# Patient Record
Sex: FEMALE | Race: WHITE | NOT HISPANIC OR LATINO | Employment: OTHER | URBAN - NONMETROPOLITAN AREA
[De-identification: names, ages, dates, MRNs, and addresses within clinical notes are randomized per-mention and may not be internally consistent; named-entity substitution may affect disease eponyms.]

---

## 2017-01-24 ENCOUNTER — CLINICAL SUPPORT (OUTPATIENT)
Dept: CARDIOLOGY | Facility: CLINIC | Age: 82
End: 2017-01-24

## 2017-01-24 DIAGNOSIS — Z95.0 CARDIAC PACEMAKER IN SITU: Primary | ICD-10-CM

## 2017-01-24 DIAGNOSIS — I49.5 SA NODE DYSFUNCTION (HCC): ICD-10-CM

## 2017-01-24 PROCEDURE — 93280 PM DEVICE PROGR EVAL DUAL: CPT | Performed by: INTERNAL MEDICINE

## 2017-01-24 NOTE — MR AVS SNAPSHOT
Kal Fountain   1/24/2017 2:00 PM   Appointment    Dept Phone:  [phone]   Encounter #:  [id number]    Provider:  [facility]   Department:  [facility]                Your Full Care Plan              Your Updated Medication List          This list is accurate as of: 1/24/17  1:55 PM.  Always use your most recent med list.                acetaminophen 650 MG 8 hr tablet   Commonly known as:  TYLENOL       aspirin 81 MG EC tablet       diltiaZEM  MG 24 hr capsule   Commonly known as:  CARDIZEM CD       glucosamine sulfate 500 MG capsule capsule       lisinopril 40 MG tablet   Commonly known as:  PRINIVIL,ZESTRIL       metoprolol tartrate 100 MG tablet   Commonly known as:  LOPRESSOR       * MULTIVITAMIN ADULT PO       * ICAPS PO       polyethylene glycol powder   Commonly known as:  MIRALAX       pravastatin 40 MG tablet   Commonly known as:  PRAVACHOL       risperiDONE 0.25 MG tablet   Commonly known as:  risperDAL       * Notice:  This list has 2 medication(s) that are the same as other medications prescribed for you. Read the directions carefully, and ask your doctor or other care provider to review them with you.            Instructions     None    Patient Instructions History      Upcoming Appointments     Visit Type Date Time Department    PACEMAKER CHECK 1/24/2017  2:00 PM [facility]    FOLLOW UP 3/10/2017 10:30 AM [facility] FOLLOW UP 4/5/2017  8:15 AM [facility]    PACEMAKER CHECK 7/26/2017  8:30 AM [facility]      MyChart Signup     [facility] [employer] allows you to send messages to your doctor, view your test results, renew your prescriptions, schedule appointments, and more. To sign up, go to [facility] and click on the Sign Up Now link in the New User? box. Enter your [employer] Activation Code exactly as it appears below along with the last four digits of your  Social Security Number and your Date of Birth () to complete the sign-up process. If you do not sign up before the expiration date, you must request a new code.    [employer] Activation Code: [email or web address]  Expires: 2017  1:54 PM    If you have questions, you can email [email or web address] or call [phone] to talk to our [employer] staff. Remember, [employer] is NOT to be used for urgent needs. For medical emergencies, dial 911.               Other Info from Your Visit           Your Appointments     2017  2:00 PM CST   PACEMAKER CHECK with [facility] (--)    [address]  [facility] [address]   [phone]            Mar 10, 2017 10:30 AM CST   Follow Up with FEMI Middleton   [facility] (--)    [address]  [facility] [id number] [phone]           Arrive 15 minutes prior to appointment.            2017  8:15 AM CDT   Hospital Follow Up with Luis Armando Brown MD   [facility]ART GROUP (--)    [address]  [place] [address]   [phone]            2017  8:30 AM CDT   PACEMAKER CHECK with [facility] (--)    [address]  [place] [address]   [phone]              Allergies     No Known Allergies      Vital Signs     Smoking Status                   Never Smoker

## 2017-01-24 NOTE — PROGRESS NOTES
Dual Chamber Pacemaker Evaluation Report  In office    January 24, 2017    Primary Cardiologist: Kevin  : Medtronic Model: Adapta  Implant date: December 21, 2016    Reason for evaluation:6 week implant follow up  Indication for pacemaker: SA node dysfunction    Measurements  Atrial sensing - P wave: 4-5.6 mV  Atrial threshold: 1V@ 0.4ms  Atrial lead impedance: 565 ohms  Ventricular sensing - R wave: 16-22.4 mV  Ventricular threshold: 0.75 V @ 0.4 ms  Ventricular lead impedance:   631 ohms     Diagnostic Data  Atrial paced: 80.1 %  Ventricular paced: 3.9 %  Other: 2 SVT episodes- 2 seconds long, max v rate 219 bpm   Incision well approximated, free from redness and drainage  Battery status: satisfactory   Est 9 years      Final Parameters  Mode:  AAI+  Lower rate: 60 bpm   Upper rate: 130 bpm  AV Delay: paced- 150 ms  Sensed-120 ms  Atrial - Amplitude: 2 V   Pulse width: 0.4 ms   Sensitivity: 0.5 mV     Ventricular - Amplitude: 1.5 V  Pulse width: 0.4 ms  Sensitivity: 5.6 mV    Changes made: changed atrial output to 2V  Conclusions: normal pacemaker function and stable pacing and sensing thresholds    Follow up: 6 months

## 2017-03-29 PROBLEM — K21.9 GASTRO-ESOPHAGEAL REFLUX: Status: ACTIVE | Noted: 2017-03-29

## 2017-03-29 PROBLEM — I10 HYPERTENSION: Status: ACTIVE | Noted: 2017-03-29

## 2017-03-29 PROBLEM — H61.20 CERUMEN DEBRIS ON TYMPANIC MEMBRANE: Status: ACTIVE | Noted: 2017-03-29

## 2017-03-29 PROBLEM — Z95.0 STATUS POST PLACEMENT OF CARDIAC PACEMAKER: Status: ACTIVE | Noted: 2017-03-29

## 2017-03-29 PROBLEM — I87.2 VENOUS INSUFFICIENCY OF LEG: Status: ACTIVE | Noted: 2017-03-29

## 2017-03-29 PROBLEM — M19.90 ARTHRITIS: Status: ACTIVE | Noted: 2017-03-29

## 2017-03-29 PROBLEM — R60.9 EDEMA: Status: ACTIVE | Noted: 2017-03-29

## 2017-03-29 PROBLEM — I49.5 SINOATRIAL NODE DYSFUNCTION (HCC): Status: ACTIVE | Noted: 2017-03-29

## 2017-03-29 PROBLEM — J30.9 ALLERGIC RHINITIS: Status: ACTIVE | Noted: 2017-03-29

## 2017-03-29 PROBLEM — I47.1 PAROXYSMAL SVT (SUPRAVENTRICULAR TACHYCARDIA) (HCC): Status: ACTIVE | Noted: 2017-03-29

## 2017-04-05 ENCOUNTER — OFFICE VISIT (OUTPATIENT)
Dept: CARDIOLOGY | Facility: CLINIC | Age: 82
End: 2017-04-05

## 2017-04-05 VITALS
DIASTOLIC BLOOD PRESSURE: 70 MMHG | HEIGHT: 66 IN | BODY MASS INDEX: 26.36 KG/M2 | WEIGHT: 164 LBS | SYSTOLIC BLOOD PRESSURE: 118 MMHG | HEART RATE: 60 BPM

## 2017-04-05 DIAGNOSIS — I47.1 PAROXYSMAL SVT (SUPRAVENTRICULAR TACHYCARDIA) (HCC): ICD-10-CM

## 2017-04-05 DIAGNOSIS — Z95.0 CARDIAC PACEMAKER IN SITU: Primary | ICD-10-CM

## 2017-04-05 DIAGNOSIS — I49.5 SINOATRIAL NODE DYSFUNCTION (HCC): ICD-10-CM

## 2017-04-05 PROCEDURE — 99213 OFFICE O/P EST LOW 20 MIN: CPT | Performed by: INTERNAL MEDICINE

## 2017-04-05 RX ORDER — SENNA AND DOCUSATE SODIUM 50; 8.6 MG/1; MG/1
1 TABLET, FILM COATED ORAL DAILY
COMMUNITY

## 2017-04-05 RX ORDER — OMEPRAZOLE 20 MG/1
20 CAPSULE, DELAYED RELEASE ORAL 2 TIMES DAILY
COMMUNITY

## 2017-04-05 NOTE — PROGRESS NOTES
Subjective    Kal Fountain is a 89 y.o. female. Fu of PGR and rhythm    History of Present Illness     S/P PGR 12/16:  This was her 2nd pgr and went well. Interrogation 1/17 is good and healing is good and not at all bothersome.     SSS:  No palpitations and rhythm is good on pacer checks.    HTN;  Good control today and at the NH too. Tolerates meds ok.      The following portions of the patient's history were reviewed and updated as appropriate: allergies, current medications, past family history, past medical history, past social history, past surgical history and problem list.    Patient Active Problem List   Diagnosis   • Cardiac pacemaker in situ   • Hypertension   • Paroxysmal SVT (supraventricular tachycardia)   • Sinoatrial node dysfunction   • Venous insufficiency of leg   • Allergic rhinitis   • Gastro-esophageal reflux   • Cerumen debris on tympanic membrane   • Arthritis   • Status post placement of cardiac pacemaker   • Edema       No Known Allergies    Family History   Problem Relation Age of Onset   • Coronary artery disease Father        Social History     Social History   • Marital status:      Spouse name: N/A   • Number of children: N/A   • Years of education: N/A     Occupational History   • Not on file.     Social History Main Topics   • Smoking status: Never Smoker   • Smokeless tobacco: Not on file   • Alcohol use No   • Drug use: Defer   • Sexual activity: No     Other Topics Concern   • Not on file     Social History Narrative         Current Outpatient Prescriptions:   •  acetaminophen (TYLENOL) 650 MG 8 hr tablet, Take 650 mg by mouth 2 (Two) Times a Day., Disp: , Rfl:   •  aspirin 81 MG EC tablet, Take 81 mg by mouth Daily., Disp: , Rfl:   •  diltiazem CD (CARDIZEM CD) 120 MG 24 hr capsule, Take 120 mg by mouth Daily., Disp: , Rfl:   •  glucosamine sulfate 500 MG capsule capsule, Take 1,000 mg by mouth Daily., Disp: , Rfl:   •  lisinopril (PRINIVIL,ZESTRIL) 40 MG tablet, Take 40  "mg by mouth Daily., Disp: , Rfl:   •  metoprolol tartrate (LOPRESSOR) 100 MG tablet, Take 100 mg by mouth 2 (Two) Times a Day., Disp: , Rfl:   •  Multiple Vitamins-Minerals (ICAPS PO), Take 2 tablets by mouth Daily., Disp: , Rfl:   •  Multiple Vitamins-Minerals (MULTIVITAMIN ADULT PO), Take 1 tablet by mouth Daily., Disp: , Rfl:   •  omeprazole (priLOSEC) 20 MG capsule, Take 20 mg by mouth Daily., Disp: , Rfl:   •  polyethylene glycol (MIRALAX) powder, 17 g Daily., Disp: , Rfl:   •  pravastatin (PRAVACHOL) 40 MG tablet, 40 mg Daily., Disp: , Rfl:   •  sennosides-docusate sodium (SENOKOT-S) 8.6-50 MG tablet, Take 1 tablet by mouth Daily., Disp: , Rfl:     Past Surgical History:   Procedure Laterality Date   • APPENDECTOMY     • BREAST BIOPSY     • CARDIAC ELECTROPHYSIOLOGY PROCEDURE N/A 12/21/2016    Procedure: PPM generator change - dual;  Surgeon: Luis Armando Brown MD;  Location: [facility] CATH INVASIVE LOCATION;  Service:    • CHOLECYSTECTOMY     • MYRINGOPLASTY W/ PAPER PATCH     • PACEMAKER IMPLANTATION         Review of Systems   Constitutional: Positive for fatigue. Negative for activity change and appetite change.   Respiratory: Negative for apnea, chest tightness and shortness of breath.    Cardiovascular: Negative for chest pain, palpitations and leg swelling.        Feet discolourating   Gastrointestinal: Negative for abdominal pain.   Genitourinary: Negative for dysuria.   Musculoskeletal: Negative for myalgias.   Neurological: Negative for weakness and light-headedness.   Psychiatric/Behavioral: Negative for sleep disturbance.       /70 (BP Location: Right arm, Patient Position: Sitting)  Pulse 60  Ht 66\" (167.6 cm)  Wt 164 lb (74.4 kg)  BMI 26.47 kg/m2  Procedures    Objective   Physical Exam   Constitutional: She is oriented to person, place, and time. She appears well-developed and well-nourished.   HENT:   Head: Normocephalic.   Eyes: Pupils are equal, round, and reactive to light. "   Cardiovascular: Normal rate, regular rhythm, normal heart sounds and intact distal pulses.  Exam reveals no gallop and no friction rub.    No murmur heard.  Pulmonary/Chest: Effort normal and breath sounds normal. No respiratory distress. She has no wheezes. She has no rales.   Musculoskeletal: She exhibits no edema or tenderness.   Venous congestion discoloration   Neurological: She is alert and oriented to person, place, and time.   Skin: Skin is warm and dry.   Psychiatric: She has a normal mood and affect.       Assessment/Plan   Kal was seen today for rapid heart rate and hypertension.    Diagnoses and all orders for this visit:    Cardiac pacemaker in situ  Comments:  healed well and functioning ok    Paroxysmal SVT (supraventricular tachycardia)  Comments:  controlled with meds and pacer    Sinoatrial node dysfunction  Comments:  controlled                 Return in about 1 year (around 4/5/2018) for Next scheduled follow up.  No orders of the defined types were placed in this encounter.

## 2017-07-17 ENCOUNTER — HOSPITAL ENCOUNTER (INPATIENT)
Facility: HOSPITAL | Age: 82
LOS: 3 days | Discharge: NURSING, REHAB OR LONG-TERM CARE | End: 2017-07-20
Attending: EMERGENCY MEDICINE | Admitting: FAMILY MEDICINE

## 2017-07-17 ENCOUNTER — APPOINTMENT (OUTPATIENT)
Dept: GENERAL RADIOLOGY | Facility: HOSPITAL | Age: 82
End: 2017-07-17

## 2017-07-17 DIAGNOSIS — I48.91 NEW ONSET A-FIB (HCC): Primary | ICD-10-CM

## 2017-07-17 DIAGNOSIS — I50.21 ACUTE SYSTOLIC CONGESTIVE HEART FAILURE (HCC): ICD-10-CM

## 2017-07-17 DIAGNOSIS — J44.1 CHRONIC OBSTRUCTIVE PULMONARY DISEASE WITH ACUTE EXACERBATION (HCC): ICD-10-CM

## 2017-07-17 DIAGNOSIS — Z74.09 IMPAIRED FUNCTIONAL MOBILITY, BALANCE, AND ENDURANCE: ICD-10-CM

## 2017-07-17 DIAGNOSIS — N39.0 ACUTE UTI (URINARY TRACT INFECTION): ICD-10-CM

## 2017-07-17 LAB
ALBUMIN SERPL-MCNC: 3.9 G/DL (ref 3.5–5)
ALBUMIN/GLOB SERPL: 1.4 G/DL (ref 1.1–2.5)
ALP SERPL-CCNC: 124 U/L (ref 24–120)
ALT SERPL W P-5'-P-CCNC: 93 U/L (ref 0–54)
ANION GAP SERPL CALCULATED.3IONS-SCNC: 10 MMOL/L (ref 4–13)
APTT PPP: 29.1 SECONDS (ref 24.1–34.8)
ARTERIAL PATENCY WRIST A: ABNORMAL
AST SERPL-CCNC: 77 U/L (ref 7–45)
ATMOSPHERIC PRESS: ABNORMAL MMHG
BACTERIA UR QL AUTO: ABNORMAL /HPF
BASE EXCESS BLDA CALC-SCNC: -5.1 MMOL/L (ref -2–2)
BASOPHILS # BLD AUTO: 0.01 10*3/MM3 (ref 0–0.2)
BASOPHILS NFR BLD AUTO: 0.1 % (ref 0–2)
BDY SITE: ABNORMAL
BILIRUB SERPL-MCNC: 1 MG/DL (ref 0.1–1)
BILIRUB UR QL STRIP: NEGATIVE
BUN BLD-MCNC: 18 MG/DL (ref 5–21)
BUN/CREAT SERPL: 24 (ref 7–25)
CALCIUM SPEC-SCNC: 8.9 MG/DL (ref 8.4–10.4)
CHLORIDE SERPL-SCNC: 99 MMOL/L (ref 98–110)
CLARITY UR: ABNORMAL
CO2 SERPL-SCNC: 20 MMOL/L (ref 24–31)
COLOR UR: ABNORMAL
CREAT BLD-MCNC: 0.75 MG/DL (ref 0.5–1.4)
D DIMER PPP FEU-MCNC: 1.48 MG/L (FEU) (ref 0–0.5)
D-LACTATE SERPL-SCNC: 1.4 MMOL/L (ref 0.5–2)
DEPRECATED RDW RBC AUTO: 49.7 FL (ref 40–54)
EOSINOPHIL # BLD AUTO: 0.01 10*3/MM3 (ref 0–0.7)
EOSINOPHIL NFR BLD AUTO: 0.1 % (ref 0–4)
ERYTHROCYTE [DISTWIDTH] IN BLOOD BY AUTOMATED COUNT: 14.8 % (ref 12–15)
GFR SERPL CREATININE-BSD FRML MDRD: 73 ML/MIN/1.73
GLOBULIN UR ELPH-MCNC: 2.8 GM/DL
GLUCOSE BLD-MCNC: 93 MG/DL (ref 70–100)
GLUCOSE UR STRIP-MCNC: NEGATIVE MG/DL
HCO3 BLDA-SCNC: 17.2 MMOL/L (ref 22–26)
HCT VFR BLD AUTO: 40.1 % (ref 37–47)
HGB BLD-MCNC: 13.7 G/DL (ref 12–16)
HGB UR QL STRIP.AUTO: ABNORMAL
INR PPP: 1.11 (ref 0.91–1.09)
KETONES UR QL STRIP: ABNORMAL
LEUKOCYTE ESTERASE UR QL STRIP.AUTO: ABNORMAL
LYMPHOCYTES # BLD AUTO: 1.24 10*3/MM3 (ref 0.72–4.86)
LYMPHOCYTES NFR BLD AUTO: 13.9 % (ref 15–45)
MCH RBC QN AUTO: 32.4 PG (ref 28–32)
MCHC RBC AUTO-ENTMCNC: 34.2 G/DL (ref 33–36)
MCV RBC AUTO: 94.8 FL (ref 82–98)
MODALITY: ABNORMAL
MONOCYTES # BLD AUTO: 0.99 10*3/MM3 (ref 0.19–1.3)
MONOCYTES NFR BLD AUTO: 11.1 % (ref 4–12)
NEUTROPHILS # BLD AUTO: 6.69 10*3/MM3 (ref 1.87–8.4)
NEUTROPHILS NFR BLD AUTO: 74.8 % (ref 39–78)
NITRITE UR QL STRIP: POSITIVE
NT-PROBNP SERPL-MCNC: ABNORMAL PG/ML (ref 0–1800)
PCO2 BLDA: 25.8 MM HG (ref 35–45)
PH BLDA: 7.44 PH UNITS (ref 7.35–7.45)
PH UR STRIP.AUTO: 6 [PH] (ref 5–8)
PLATELET # BLD AUTO: 216 10*3/MM3 (ref 130–400)
PMV BLD AUTO: 10.5 FL (ref 6–12)
PO2 BLDA: 83.7 MM HG (ref 80–100)
POTASSIUM BLD-SCNC: 4.6 MMOL/L (ref 3.5–5.3)
PROT SERPL-MCNC: 6.7 G/DL (ref 6.3–8.7)
PROT UR QL STRIP: ABNORMAL
PROTHROMBIN TIME: 14.7 SECONDS (ref 11.9–14.6)
RBC # BLD AUTO: 4.23 10*6/MM3 (ref 4.2–5.4)
RBC # UR: ABNORMAL /HPF
REF LAB TEST METHOD: ABNORMAL
SAO2 % BLDCOA: 96.8 % (ref 94–100)
SAO2 % BLDCOA: 96.8 % (ref 94–100)
SODIUM BLD-SCNC: 129 MMOL/L (ref 135–145)
SP GR UR STRIP: 1.02 (ref 1–1.03)
SQUAMOUS #/AREA URNS HPF: ABNORMAL /HPF
UROBILINOGEN UR QL STRIP: ABNORMAL
WBC NRBC COR # BLD: 8.94 10*3/MM3 (ref 4.8–10.8)
WBC UR QL AUTO: ABNORMAL /HPF

## 2017-07-17 PROCEDURE — 87086 URINE CULTURE/COLONY COUNT: CPT | Performed by: EMERGENCY MEDICINE

## 2017-07-17 PROCEDURE — 85730 THROMBOPLASTIN TIME PARTIAL: CPT | Performed by: EMERGENCY MEDICINE

## 2017-07-17 PROCEDURE — P9612 CATHETERIZE FOR URINE SPEC: HCPCS

## 2017-07-17 PROCEDURE — 25010000002 CEFTRIAXONE: Performed by: EMERGENCY MEDICINE

## 2017-07-17 PROCEDURE — 36415 COLL VENOUS BLD VENIPUNCTURE: CPT | Performed by: EMERGENCY MEDICINE

## 2017-07-17 PROCEDURE — 80053 COMPREHEN METABOLIC PANEL: CPT | Performed by: EMERGENCY MEDICINE

## 2017-07-17 PROCEDURE — 25010000002 METHYLPREDNISOLONE PER 125 MG: Performed by: EMERGENCY MEDICINE

## 2017-07-17 PROCEDURE — 99285 EMERGENCY DEPT VISIT HI MDM: CPT

## 2017-07-17 PROCEDURE — 71010 HC CHEST PA OR AP: CPT

## 2017-07-17 PROCEDURE — 94799 UNLISTED PULMONARY SVC/PX: CPT

## 2017-07-17 PROCEDURE — 82803 BLOOD GASES ANY COMBINATION: CPT

## 2017-07-17 PROCEDURE — 87186 SC STD MICRODIL/AGAR DIL: CPT | Performed by: EMERGENCY MEDICINE

## 2017-07-17 PROCEDURE — 94640 AIRWAY INHALATION TREATMENT: CPT

## 2017-07-17 PROCEDURE — 87088 URINE BACTERIA CULTURE: CPT | Performed by: EMERGENCY MEDICINE

## 2017-07-17 PROCEDURE — 81001 URINALYSIS AUTO W/SCOPE: CPT | Performed by: EMERGENCY MEDICINE

## 2017-07-17 PROCEDURE — 87040 BLOOD CULTURE FOR BACTERIA: CPT | Performed by: EMERGENCY MEDICINE

## 2017-07-17 PROCEDURE — 93010 ELECTROCARDIOGRAM REPORT: CPT | Performed by: INTERNAL MEDICINE

## 2017-07-17 PROCEDURE — 25010000002 ENOXAPARIN PER 10 MG: Performed by: EMERGENCY MEDICINE

## 2017-07-17 PROCEDURE — 93005 ELECTROCARDIOGRAM TRACING: CPT | Performed by: EMERGENCY MEDICINE

## 2017-07-17 PROCEDURE — 85025 COMPLETE CBC W/AUTO DIFF WBC: CPT | Performed by: EMERGENCY MEDICINE

## 2017-07-17 PROCEDURE — 36600 WITHDRAWAL OF ARTERIAL BLOOD: CPT

## 2017-07-17 PROCEDURE — 83605 ASSAY OF LACTIC ACID: CPT | Performed by: EMERGENCY MEDICINE

## 2017-07-17 PROCEDURE — 85610 PROTHROMBIN TIME: CPT | Performed by: EMERGENCY MEDICINE

## 2017-07-17 PROCEDURE — 85379 FIBRIN DEGRADATION QUANT: CPT | Performed by: EMERGENCY MEDICINE

## 2017-07-17 PROCEDURE — 83880 ASSAY OF NATRIURETIC PEPTIDE: CPT | Performed by: EMERGENCY MEDICINE

## 2017-07-17 RX ORDER — HYDROCODONE BITARTRATE AND ACETAMINOPHEN 5; 325 MG/1; MG/1
1 TABLET ORAL EVERY 4 HOURS PRN
Status: DISCONTINUED | OUTPATIENT
Start: 2017-07-17 | End: 2017-07-20 | Stop reason: HOSPADM

## 2017-07-17 RX ORDER — ONDANSETRON 2 MG/ML
4 INJECTION INTRAMUSCULAR; INTRAVENOUS EVERY 6 HOURS PRN
Status: DISCONTINUED | OUTPATIENT
Start: 2017-07-17 | End: 2017-07-20 | Stop reason: HOSPADM

## 2017-07-17 RX ORDER — LORAZEPAM 0.5 MG/1
0.5 TABLET ORAL DAILY
Status: ON HOLD | COMMUNITY
End: 2017-07-20

## 2017-07-17 RX ORDER — IPRATROPIUM BROMIDE AND ALBUTEROL SULFATE 2.5; .5 MG/3ML; MG/3ML
3 SOLUTION RESPIRATORY (INHALATION) ONCE
Status: COMPLETED | OUTPATIENT
Start: 2017-07-17 | End: 2017-07-17

## 2017-07-17 RX ORDER — ACETAMINOPHEN 325 MG/1
650 TABLET ORAL EVERY 4 HOURS PRN
Status: DISCONTINUED | OUTPATIENT
Start: 2017-07-17 | End: 2017-07-17 | Stop reason: SDUPTHER

## 2017-07-17 RX ORDER — PANTOPRAZOLE SODIUM 40 MG/1
40 TABLET, DELAYED RELEASE ORAL EVERY MORNING
Status: DISCONTINUED | OUTPATIENT
Start: 2017-07-18 | End: 2017-07-20 | Stop reason: HOSPADM

## 2017-07-17 RX ORDER — DILTIAZEM HCL/D5W 125 MG/125
5-15 PLASTIC BAG, INJECTION (ML) INTRAVENOUS
Status: DISCONTINUED | OUTPATIENT
Start: 2017-07-17 | End: 2017-07-20

## 2017-07-17 RX ORDER — ACETAMINOPHEN 500 MG
500 TABLET ORAL 4 TIMES DAILY PRN
Status: DISCONTINUED | OUTPATIENT
Start: 2017-07-17 | End: 2017-07-20 | Stop reason: HOSPADM

## 2017-07-17 RX ORDER — POLYETHYLENE GLYCOL 3350 17 G/17G
17 POWDER, FOR SOLUTION ORAL DAILY
Status: DISCONTINUED | OUTPATIENT
Start: 2017-07-17 | End: 2017-07-20 | Stop reason: HOSPADM

## 2017-07-17 RX ORDER — LUBIPROSTONE 8 UG/1
8 CAPSULE ORAL 2 TIMES DAILY WITH MEALS
COMMUNITY

## 2017-07-17 RX ORDER — ATORVASTATIN CALCIUM 10 MG/1
10 TABLET, FILM COATED ORAL DAILY
Status: DISCONTINUED | OUTPATIENT
Start: 2017-07-17 | End: 2017-07-20 | Stop reason: HOSPADM

## 2017-07-17 RX ORDER — LORAZEPAM 0.5 MG/1
0.5 TABLET ORAL DAILY
Status: DISCONTINUED | OUTPATIENT
Start: 2017-07-17 | End: 2017-07-18

## 2017-07-17 RX ORDER — SODIUM CHLORIDE 0.9 % (FLUSH) 0.9 %
1-10 SYRINGE (ML) INJECTION AS NEEDED
Status: DISCONTINUED | OUTPATIENT
Start: 2017-07-17 | End: 2017-07-20 | Stop reason: HOSPADM

## 2017-07-17 RX ORDER — LUBIPROSTONE 8 UG/1
8 CAPSULE ORAL 2 TIMES DAILY WITH MEALS
Status: DISCONTINUED | OUTPATIENT
Start: 2017-07-17 | End: 2017-07-20 | Stop reason: HOSPADM

## 2017-07-17 RX ORDER — DILTIAZEM HYDROCHLORIDE 240 MG/1
240 CAPSULE, COATED, EXTENDED RELEASE ORAL DAILY
Status: DISCONTINUED | OUTPATIENT
Start: 2017-07-17 | End: 2017-07-20 | Stop reason: HOSPADM

## 2017-07-17 RX ORDER — SACCHAROMYCES BOULARDII 250 MG
250 CAPSULE ORAL 2 TIMES DAILY
COMMUNITY

## 2017-07-17 RX ORDER — ACETAMINOPHEN 500 MG
500 TABLET ORAL 4 TIMES DAILY PRN
COMMUNITY

## 2017-07-17 RX ORDER — ASPIRIN 81 MG/1
81 TABLET ORAL DAILY
Status: DISCONTINUED | OUTPATIENT
Start: 2017-07-17 | End: 2017-07-20 | Stop reason: HOSPADM

## 2017-07-17 RX ORDER — METHYLPREDNISOLONE SODIUM SUCCINATE 125 MG/2ML
125 INJECTION, POWDER, LYOPHILIZED, FOR SOLUTION INTRAMUSCULAR; INTRAVENOUS ONCE
Status: COMPLETED | OUTPATIENT
Start: 2017-07-17 | End: 2017-07-17

## 2017-07-17 RX ORDER — ALUMINA, MAGNESIA, AND SIMETHICONE 2400; 2400; 240 MG/30ML; MG/30ML; MG/30ML
15 SUSPENSION ORAL EVERY 6 HOURS PRN
Status: DISCONTINUED | OUTPATIENT
Start: 2017-07-17 | End: 2017-07-20 | Stop reason: HOSPADM

## 2017-07-17 RX ORDER — METOPROLOL TARTRATE 100 MG/1
100 TABLET ORAL 2 TIMES DAILY
Status: DISCONTINUED | OUTPATIENT
Start: 2017-07-17 | End: 2017-07-20 | Stop reason: HOSPADM

## 2017-07-17 RX ORDER — SENNA AND DOCUSATE SODIUM 50; 8.6 MG/1; MG/1
1 TABLET, FILM COATED ORAL DAILY
Status: DISCONTINUED | OUTPATIENT
Start: 2017-07-17 | End: 2017-07-20 | Stop reason: HOSPADM

## 2017-07-17 RX ORDER — LISINOPRIL 20 MG/1
40 TABLET ORAL DAILY
Status: DISCONTINUED | OUTPATIENT
Start: 2017-07-17 | End: 2017-07-20 | Stop reason: HOSPADM

## 2017-07-17 RX ORDER — ALBUTEROL SULFATE 2.5 MG/3ML
2.5 SOLUTION RESPIRATORY (INHALATION)
Status: COMPLETED | OUTPATIENT
Start: 2017-07-17 | End: 2017-07-17

## 2017-07-17 RX ORDER — SACCHAROMYCES BOULARDII 250 MG
250 CAPSULE ORAL 2 TIMES DAILY
Status: DISCONTINUED | OUTPATIENT
Start: 2017-07-17 | End: 2017-07-20 | Stop reason: HOSPADM

## 2017-07-17 RX ADMIN — ALBUTEROL SULFATE 2.5 MG: 2.5 SOLUTION RESPIRATORY (INHALATION) at 09:55

## 2017-07-17 RX ADMIN — IPRATROPIUM BROMIDE AND ALBUTEROL SULFATE 3 ML: .5; 3 SOLUTION RESPIRATORY (INHALATION) at 10:02

## 2017-07-17 RX ADMIN — METHYLPREDNISOLONE SODIUM SUCCINATE 125 MG: 125 INJECTION, POWDER, FOR SOLUTION INTRAMUSCULAR; INTRAVENOUS at 10:06

## 2017-07-17 RX ADMIN — ENOXAPARIN SODIUM 70 MG: 80 INJECTION SUBCUTANEOUS at 10:08

## 2017-07-17 RX ADMIN — ALBUTEROL SULFATE 2.5 MG: 2.5 SOLUTION RESPIRATORY (INHALATION) at 10:20

## 2017-07-17 RX ADMIN — Medication 5 MG/HR: at 10:14

## 2017-07-17 RX ADMIN — Medication 250 MG: at 18:42

## 2017-07-17 RX ADMIN — CEFTRIAXONE 1 G: 1 INJECTION, POWDER, FOR SOLUTION INTRAMUSCULAR; INTRAVENOUS at 10:14

## 2017-07-17 RX ADMIN — DILTIAZEM HYDROCHLORIDE 240 MG: 240 CAPSULE, EXTENDED RELEASE ORAL at 18:43

## 2017-07-17 RX ADMIN — METOPROLOL TARTRATE 100 MG: 100 TABLET ORAL at 18:43

## 2017-07-17 RX ADMIN — Medication 10 MG/HR: at 17:35

## 2017-07-17 RX ADMIN — LUBIPROSTONE 8 MCG: 8 CAPSULE, GELATIN COATED ORAL at 18:42

## 2017-07-17 RX ADMIN — LORAZEPAM 0.5 MG: 0.5 TABLET ORAL at 18:42

## 2017-07-17 NOTE — PROGRESS NOTES
Discharge Planning Assessment  [facility]     Patient Name: Kal Fountain  MRN: [id number]  Today's Date: 7/17/2017    Admit Date: 7/17/2017          Discharge Needs Assessment       07/17/17 1558    Living Environment    Lives With facility resident    Living Arrangements assisted living   [facility]    Living Environment    Provides Primary Care For no one, unable/limited ability to care for self    Discharge Needs Assessment    Concerns To Be Addressed basic needs concerns    Readmission Within The Last 30 Days no previous admission in last 30 days    Anticipated Changes Related to Illness inability to care for self    Discharge Disposition skilled nursing facility;home healthcare service            Discharge Plan       07/17/17 1600    Case Management/Social Work Plan    Plan PT is a current resident of [facility]. PT has recently dc from [facility]. PT is extremely confused at this time and is unable to effectively participate i dc planning conversation. PT's daughter is out of town currently. SW will attempt to contact her to verify dc plans. PT may need to return to [facility] if her confusion does not resolve. Will follow.     Patient/Family In Agreement With Plan yes        Discharge Placement     No information found                Demographic Summary     None            Functional Status     None            Psychosocial     None            Abuse/Neglect     None            Legal     None            Substance Abuse     None            Patient Forms     None          JARRET Monique

## 2017-07-17 NOTE — ED PROVIDER NOTES
Subjective   Patient is a 89 y.o. female presenting with shortness of breath.   Shortness of Breath   Severity:  Moderate  Onset quality:  Gradual  Timing:  Constant  Progression:  Worsening  Chronicity:  New  Context: not activity, not animal exposure, not emotional upset, not occupational exposure, not pollens, not smoke exposure, not URI and not weather changes    Relieved by:  Nothing  Worsened by:  Nothing  Ineffective treatments:  None tried  Associated symptoms: wheezing    Associated symptoms: no abdominal pain, no chest pain, no claudication, no cough, no diaphoresis, no ear pain, no fever, no headaches, no hemoptysis, no neck pain, no rash, no sore throat, no sputum production, no syncope, no swollen glands and no vomiting    Risk factors: no recent alcohol use, no hx of cancer, no hx of PE/DVT, no obesity, no prolonged immobilization and no tobacco use        Review of Systems   Constitutional: Negative.  Negative for activity change, appetite change, chills, diaphoresis, fatigue and fever.   HENT: Negative for congestion, drooling, ear pain, facial swelling, hearing loss, sinus pressure and sore throat.    Eyes: Negative.  Negative for discharge.   Respiratory: Positive for shortness of breath and wheezing. Negative for cough, hemoptysis and sputum production.    Cardiovascular: Negative for chest pain, claudication and syncope.   Gastrointestinal: Negative for abdominal distention, abdominal pain, blood in stool, diarrhea, nausea and vomiting.   Endocrine: Negative.  Negative for cold intolerance, heat intolerance, polydipsia, polyphagia and polyuria.   Genitourinary: Negative.  Negative for dysuria, flank pain and urgency.   Musculoskeletal: Negative.  Negative for arthralgias, back pain, myalgias, neck pain and neck stiffness.   Skin: Negative.  Negative for color change, pallor and rash.   Allergic/Immunologic: Negative.    Neurological: Negative.  Negative for dizziness, seizures, speech difficulty,  weakness, numbness and headaches.   Hematological: Negative.  Negative for adenopathy.   All other systems reviewed and are negative.      Past Medical History:   Diagnosis Date   • Allergic rhinitis    • Arthritis    • Cerumen debris on tympanic membrane    • Edema    • Gastro-esophageal reflux    • H/O dilation and curettage     Dilation And Curettage Of Uterus - multiple   • Hypertension    • Hypertension, benign    • Pacemaker    • Paroxysmal SVT (supraventricular tachycardia)    • Sinoatrial node dysfunction    • Status post placement of cardiac pacemaker    • Venous insufficiency of leg        Allergies   Allergen Reactions   • Claritin [Loratadine]      Unknown reaction-reported from facility   • Hctz [Hydrochlorothiazide]      Unknown reaction reported from facility       Past Surgical History:   Procedure Laterality Date   • APPENDECTOMY     • BREAST BIOPSY     • CARDIAC ELECTROPHYSIOLOGY PROCEDURE N/A 12/21/2016    Procedure: PPM generator change - dual;  Surgeon: Luis Armando Brown MD;  Location: [facility] INVASIVE LOCATION;  Service:    • CHOLECYSTECTOMY     • MYRINGOPLASTY W/ PAPER PATCH     • PACEMAKER IMPLANTATION         Family History   Problem Relation Age of Onset   • Coronary artery disease Father        Social History     Social History   • Marital status:      Spouse name: N/A   • Number of children: N/A   • Years of education: N/A     Social History Main Topics   • Smoking status: Never Smoker   • Smokeless tobacco: None   • Alcohol use No   • Drug use: Defer   • Sexual activity: No     Other Topics Concern   • None     Social History Narrative           Objective   Physical Exam   Constitutional: She is oriented to person, place, and time. She appears well-developed and well-nourished.   HENT:   Head: Normocephalic.   Right Ear: External ear normal.   Eyes: Conjunctivae are normal. Pupils are equal, round, and reactive to light.   Neck: Normal range of motion. Neck supple.    Cardiovascular: Normal heart sounds and intact distal pulses.  An irregularly irregular rhythm present. Tachycardia present.  PMI is not displaced.  Exam reveals no decreased pulses.    No murmur heard.  Pulmonary/Chest: No accessory muscle usage. Tachypnea noted. She is in respiratory distress. She has decreased breath sounds in the right lower field and the left lower field. She has wheezes in the right middle field, the right lower field, the left middle field and the left lower field. She has rhonchi in the right middle field, the right lower field, the left middle field and the left lower field. She has no rales. She exhibits no tenderness.   Abdominal: Soft. Bowel sounds are normal. There is no tenderness.   Musculoskeletal: Normal range of motion. She exhibits no edema or tenderness.   Lower extremity exam bilaterally is unremarkable.  There is no right or left calf tenderness .  There is no palpable venous cord.  No obvious difference in the size of the legs.  No pitting edema.  The dorsalis pedis and posterior tibial femoral and popliteal pulses are palpable and +2 bilaterally.  Homans sign is negative       Vascular Status -  Her exam exhibits right foot vasculature normal. Her exam exhibits no right foot edema. Her exam exhibits left foot vasculature normal. Her exam exhibits no left foot edema.  Neurological: She is alert and oriented to person, place, and time. She has normal reflexes. No cranial nerve deficit. Coordination normal.   Skin: Skin is warm. No rash noted. No erythema.   Nursing note and vitals reviewed.      Procedures         ED Course  ED Course   Comment By Time   afib Mark Abarca MD 07/17 8763   Patient with a UTI was given antibiotics in A. fib with COPD Mark Abarca MD 07/17 6498   Also lost some CHF will be admitted to the hospital under the hospitalist service Mark Abarca MD 07/17 5620                  [facility]    Final diagnoses:   New onset a-fib   Acute systolic congestive  heart failure   Acute UTI (urinary tract infection)   Chronic obstructive pulmonary disease with acute exacerbation            Mark Abarca MD  07/17/17 4340

## 2017-07-17 NOTE — H&P
[facility] Medicine Services  HISTORY AND PHYSICAL    Date of Admission: 2017  Primary Care Physician: Balaji Gordon MD    Subjective     Chief Complaint: At the time of my examination patient was uncooperative.    The only answer she would give was that she hurt all over.       History of Present Illness  Unable to obtain, patient uncooperative.           Review of Systems   Unable to obtain, patient uncooperative.     Past Medical History:   Past Medical History:   Diagnosis Date   • Allergic rhinitis    • Arthritis    • Cerumen debris on tympanic membrane    • Edema    • Gastro-esophageal reflux    • H/O dilation and curettage     Dilation And Curettage Of Uterus - multiple   • Hypertension    • Hypertension, benign    • Pacemaker    • Paroxysmal SVT (supraventricular tachycardia)    • Sinoatrial node dysfunction    • Status post placement of cardiac pacemaker    • Venous insufficiency of leg        Past Surgical History:  Past Surgical History:   Procedure Laterality Date   • APPENDECTOMY     • BREAST BIOPSY     • CARDIAC ELECTROPHYSIOLOGY PROCEDURE N/A 2016    Procedure: PPM generator change - dual;  Surgeon: Luis Armando Brown MD;  Location: [facility] INVASIVE LOCATION;  Service:    • CHOLECYSTECTOMY     • MYRINGOPLASTY W/ PAPER PATCH     • PACEMAKER IMPLANTATION         Social History: Per records does not smoke or drink.  When I asked these questions she replied yes to every question    Family History: Father  with CAD.  Mother , ? Cause.    Allergies:  Allergies   Allergen Reactions   • Claritin [Loratadine]      Unknown reaction-reported from facility   • Hctz [Hydrochlorothiazide]      Unknown reaction reported from facility       Medications:  Prior to Admission medications    Medication Sig Start Date End Date Taking? Authorizing Provider   acetaminophen (TYLENOL) 500 MG tablet Take 500 mg by mouth 4 (Four) Times a Day As Needed  for Mild Pain (1-3).   Yes Historical Provider, MD   aspirin 81 MG EC tablet Take 81 mg by mouth Daily.   Yes Historical Provider, MD   diltiazem CD (CARDIZEM CD) 120 MG 24 hr capsule Take 120 mg by mouth Daily. 9/10/16  Yes Historical Provider, MD   glucosamine sulfate 500 MG capsule capsule Take 1,000 mg by mouth Daily.   Yes Historical Provider, MD   lisinopril (PRINIVIL,ZESTRIL) 40 MG tablet Take 40 mg by mouth Daily.   Yes Historical Provider, MD   LORazepam (ATIVAN) 0.5 MG tablet Take 0.5 mg by mouth Daily.   Yes Historical Provider, MD   lubiprostone (AMITIZA) 8 MCG capsule Take 8 mcg by mouth 2 (Two) Times a Day With Meals.   Yes Historical Provider, MD   metoprolol tartrate (LOPRESSOR) 100 MG tablet Take 100 mg by mouth 2 (Two) Times a Day. 9/16/16  Yes Historical Provider, MD   Multiple Vitamins-Minerals (EYE VITAMINS) capsule Take 1 capsule by mouth Daily.   Yes Historical Provider, MD   Multiple Vitamins-Minerals (ICAPS PO) Take 2 tablets by mouth Daily.   Yes Historical Provider, MD   Multiple Vitamins-Minerals (MULTIVITAMIN ADULT PO) Take 1 tablet by mouth Daily.   Yes Historical Provider, MD   omeprazole (priLOSEC) 20 MG capsule Take 20 mg by mouth 2 (Two) Times a Day.   Yes Historical Provider, MD   polyethylene glycol (MIRALAX) powder Take 17 g by mouth Daily. 9/7/16  Yes Historical Provider, MD   pravastatin (PRAVACHOL) 40 MG tablet Take 40 mg by mouth Every Night. 9/16/16  Yes Historical Provider, MD   saccharomyces boulardii (FLORASTOR) 250 MG capsule Take 250 mg by mouth 2 (Two) Times a Day.   Yes Historical Provider, MD   sennosides-docusate sodium (SENOKOT-S) 8.6-50 MG tablet Take 1 tablet by mouth Daily.   Yes Historical Provider, MD   acetaminophen (TYLENOL) 650 MG 8 hr tablet Take 650 mg by mouth 2 (Two) Times a Day.  7/17/17  Historical Provider, MD       Objective     Vital Signs: /94 (BP Location: Right arm, Patient Position: Lying)  Pulse (!) 134  Temp 98 °F (36.7 °C) (Axillary)  "  Resp 16  Ht 66\" (167.6 cm)  Wt 162 lb (73.5 kg)  SpO2 96%  BMI 26.15 kg/m2  Physical Exam   Constitutional: She appears well-developed and well-nourished.   HENT:   Head: Normocephalic and atraumatic.   Eyes: Conjunctivae and EOM are normal. Pupils are equal, round, and reactive to light.   Mild lid edema   Neck: Normal range of motion. Neck supple. No JVD present.   Cardiovascular: Normal heart sounds and intact distal pulses.  Exam reveals no gallop and no friction rub.    No murmur heard.  Irregular, rapid   Pulmonary/Chest: Effort normal and breath sounds normal.   Abdominal: Soft. Bowel sounds are normal. There is no hepatosplenomegaly. There is no tenderness.   Musculoskeletal: Normal range of motion. She exhibits edema (mild).   Neurological: No cranial nerve deficit.   Arousable.  Will not answer questions with anything other than yes.    Skin: Skin is warm and dry.   Psychiatric:   Irritable.    Nursing note and vitals reviewed.        Results Reviewed:  Lab Results (last 24 hours)     Procedure Component Value Units Date/Time    Blood Gas, Arterial [[id number]]  (Abnormal) Collected:  07/17/17 0950    Specimen:  Arterial Blood Updated:  07/17/17 0952     Site Arterial: right brachial     Orestes's Test --      Documented in Rapid Comm        pH, Arterial 7.441 pH units      pCO2, Arterial 25.8 (L) mm Hg      pO2, Arterial 83.7 mm Hg      HCO3, Arterial 17.2 (L) mmol/L      Base Excess, Arterial -5.1 (L) mmol/L      O2 Saturation, Arterial 96.8 %      O2 Saturation Calculated 96.8 %      Barometric Pressure for Blood Gas -- mmHg       Component not reported at this site.        Modality Room air    Narrative:       Serial Number: [id number]    : [id number]    CBC & Differential [[id number]] Collected:  07/17/17 0952    Specimen:  Blood Updated:  07/17/17 0958    Narrative:       The following orders were created for panel order CBC & Differential.  Procedure                               Abnormality       "   Status                     ---------                               -----------         ------                     CBC Auto Differential[[id number]]        Abnormal            Final result                 Please view results for these tests on the individual orders.    CBC Auto Differential [[id number]]  (Abnormal) Collected:  07/17/17 0952    Specimen:  Blood Updated:  07/17/17 0958     WBC 8.94 10*3/mm3      RBC 4.23 10*6/mm3      Hemoglobin 13.7 g/dL      Hematocrit 40.1 %      MCV 94.8 fL      MCH 32.4 (H) pg      MCHC 34.2 g/dL      RDW 14.8 %      RDW-SD 49.7 fl      MPV 10.5 fL      Platelets 216 10*3/mm3      Neutrophil % 74.8 %      Lymphocyte % 13.9 (L) %      Monocyte % 11.1 %      Eosinophil % 0.1 %      Basophil % 0.1 %      Neutrophils, Absolute 6.69 10*3/mm3      Lymphocytes, Absolute 1.24 10*3/mm3      Monocytes, Absolute 0.99 10*3/mm3      Eosinophils, Absolute 0.01 10*3/mm3      Basophils, Absolute 0.01 10*3/mm3     Blood Culture [[id number]] Collected:  07/17/17 0944    Specimen:  Blood from Arm, Right Updated:  07/17/17 1002    Blood Culture [[id number]] Collected:  07/17/17 0915    Specimen:  Blood from Arm, Right Updated:  07/17/17 1003    Lactic Acid, Plasma [[id number]]  (Normal) Collected:  07/17/17 0952    Specimen:  Blood Updated:  07/17/17 1005     Lactate 1.4 mmol/L     Comprehensive Metabolic Panel [[id number]]  (Abnormal) Collected:  07/17/17 0952    Specimen:  Blood Updated:  07/17/17 1006     Glucose 93 mg/dL      BUN 18 mg/dL      Creatinine 0.75 mg/dL      Sodium 129 (L) mmol/L      Potassium 4.6 mmol/L      Chloride 99 mmol/L      CO2 20.0 (L) mmol/L      Calcium 8.9 mg/dL      Total Protein 6.7 g/dL      Albumin 3.90 g/dL      ALT (SGPT) 93 (H) U/L      AST (SGOT) 77 (H) U/L      Alkaline Phosphatase 124 (H) U/L      Total Bilirubin 1.0 mg/dL      eGFR Non African Amer 73 mL/min/1.73      Globulin 2.8 gm/dL      A/G Ratio 1.4 g/dL      BUN/Creatinine Ratio 24.0     Anion Gap 10.0  mmol/L     Narrative:       The MDRD GFR formula is only valid for adults with stable renal function between ages 18 and 70.    Protime-INR [[id number]]  (Abnormal) Collected:  07/17/17 0952    Specimen:  Blood Updated:  07/17/17 1011     Protime 14.7 (H) Seconds      INR 1.11 (H)    aPTT [[id number]]  (Normal) Collected:  07/17/17 0952    Specimen:  Blood Updated:  07/17/17 1011     PTT 29.1 seconds     D-dimer, Quantitative [[id number]]  (Abnormal) Collected:  07/17/17 0952    Specimen:  Blood Updated:  07/17/17 1011     D-Dimer, Quantitative 1.48 (H) mg/L (FEU)     Narrative:       Reference Range is 0-0.50 mg/L FEU. However, results <0.50 mg/L FEU tends to rule out DVT or PE. Results >0.50 mg/L FEU are not useful in predicting absence or presence of DVT or PE.    BNP [[id number]]  (Abnormal) Collected:  07/17/17 0952    Specimen:  Blood Updated:  07/17/17 1014     proBNP 20845.0 (H) pg/mL     Urine Culture [[id number]] Collected:  07/17/17 1006    Specimen:  Urine from Urine, Catheter Updated:  07/17/17 1025    Urinalysis With / Culture If Indicated [[id number]]  (Abnormal) Collected:  07/17/17 1006    Specimen:  Urine from Urine, Catheter Updated:  07/17/17 1040     Color, UA Dark Yellow (A)     Appearance, UA Turbid (A)     pH, UA 6.0     Specific Gravity, UA 1.022     Glucose, UA Negative     Ketones, UA 15 mg/dL (1+) (A)     Bilirubin, UA Negative     Blood, UA Large (3+) (A)     Protein, UA >=300 mg/dL (3+) (A)     Leuk Esterase, UA Large (3+) (A)     Nitrite, UA Positive (A)     Urobilinogen, UA 1.0 E.U./dL    Urinalysis, Microscopic Only [[id number]]  (Abnormal) Collected:  07/17/17 1006    Specimen:  Urine from Urine, Catheter Updated:  07/17/17 1040     RBC, UA Too Numerous to Count (A) /HPF      WBC, UA Too Numerous to Count (A) /HPF      Bacteria, UA 3+ (A) /HPF      Squamous Epithelial Cells, UA 3-6 (A) /HPF      Methodology Automated Microscopy        Imaging Results (last 24 hours)     Procedure Component  Value Units Date/Time    XR Chest 1 View [[id number]] Collected:  07/17/17 1156     Updated:  07/17/17 1202    Narrative:       EXAMINATION: XR CHEST 1 VW-. 7/17/2017 12:56 PM EDT     CHEST, ONE VIEW:     HISTORY: Shortness of air     COMPARISON: 07/08/2015     A single frontal chest radiograph was obtained.     FINDINGS:     There is cardiomegaly with permanent cardiac pacemaker observed.     There is bilateral perihilar and lower lobe infiltrates, differential  concerns include pulmonary edema and mild changes of heart failure  correlate with patient presentation.                                     Impression:       1. Cardiomegaly with bilateral perihilar and lobe infiltrates. Pulmonary  edema and congestive heart failure considered.     This report was finalized on 07/17/2017 11:59 by Dr. Rogelio Bedolla MD.          I have personally reviewed and interpreted the radiology studies and ECG obtained at time of admission.     Assessment / Plan     Assessment & Plan  Hospital Problem List     New onset a-fib        Assessment         Paroxysmal atrial fibrillation  Confusion v. Purposefully not interacting with physician  Congestive heart failure. Acute      Plan    Admit  IV cardizem continued  Wean as able   Increase oral cardizem  Will reassess after she rests some.   serial troponin x 3        Code Status: DNR  Health care surrogate not known  Patient does not say  Lives in an assisted living       I discussed the patients findings and my recommendations with patient    Estimated length of stay 3 days    Chanell Bella DO   07/17/17   12:29 PM

## 2017-07-18 ENCOUNTER — APPOINTMENT (OUTPATIENT)
Dept: GENERAL RADIOLOGY | Facility: HOSPITAL | Age: 82
End: 2017-07-18

## 2017-07-18 LAB
ALBUMIN SERPL-MCNC: 3.5 G/DL (ref 3.5–5)
ALBUMIN/GLOB SERPL: 1.3 G/DL (ref 1.1–2.5)
ALP SERPL-CCNC: 98 U/L (ref 24–120)
ALT SERPL W P-5'-P-CCNC: 84 U/L (ref 0–54)
ANION GAP SERPL CALCULATED.3IONS-SCNC: 14 MMOL/L (ref 4–13)
AST SERPL-CCNC: 61 U/L (ref 7–45)
BASOPHILS # BLD AUTO: 0 10*3/MM3 (ref 0–0.2)
BASOPHILS NFR BLD AUTO: 0 % (ref 0–2)
BILIRUB SERPL-MCNC: 0.7 MG/DL (ref 0.1–1)
BUN BLD-MCNC: 19 MG/DL (ref 5–21)
BUN/CREAT SERPL: 28.4 (ref 7–25)
CALCIUM SPEC-SCNC: 8.7 MG/DL (ref 8.4–10.4)
CHLORIDE SERPL-SCNC: 100 MMOL/L (ref 98–110)
CO2 SERPL-SCNC: 16 MMOL/L (ref 24–31)
CREAT BLD-MCNC: 0.67 MG/DL (ref 0.5–1.4)
DEPRECATED RDW RBC AUTO: 50.7 FL (ref 40–54)
EOSINOPHIL # BLD AUTO: 0.01 10*3/MM3 (ref 0–0.7)
EOSINOPHIL NFR BLD AUTO: 0.2 % (ref 0–4)
ERYTHROCYTE [DISTWIDTH] IN BLOOD BY AUTOMATED COUNT: 14.7 % (ref 12–15)
GFR SERPL CREATININE-BSD FRML MDRD: 83 ML/MIN/1.73
GLOBULIN UR ELPH-MCNC: 2.7 GM/DL
GLUCOSE BLD-MCNC: 125 MG/DL (ref 70–100)
HCT VFR BLD AUTO: 36.7 % (ref 37–47)
HGB BLD-MCNC: 12.3 G/DL (ref 12–16)
IMM GRANULOCYTES # BLD: 0.04 10*3/MM3 (ref 0–0.03)
IMM GRANULOCYTES NFR BLD: 0.7 % (ref 0–5)
LYMPHOCYTES # BLD AUTO: 0.47 10*3/MM3 (ref 0.72–4.86)
LYMPHOCYTES NFR BLD AUTO: 8.7 % (ref 15–45)
MCH RBC QN AUTO: 31.9 PG (ref 28–32)
MCHC RBC AUTO-ENTMCNC: 33.5 G/DL (ref 33–36)
MCV RBC AUTO: 95.1 FL (ref 82–98)
MONOCYTES # BLD AUTO: 0.26 10*3/MM3 (ref 0.19–1.3)
MONOCYTES NFR BLD AUTO: 4.8 % (ref 4–12)
NEUTROPHILS # BLD AUTO: 4.63 10*3/MM3 (ref 1.87–8.4)
NEUTROPHILS NFR BLD AUTO: 85.6 % (ref 39–78)
PLATELET # BLD AUTO: 244 10*3/MM3 (ref 130–400)
PMV BLD AUTO: 10.2 FL (ref 6–12)
POTASSIUM BLD-SCNC: 4.6 MMOL/L (ref 3.5–5.3)
PROT SERPL-MCNC: 6.2 G/DL (ref 6.3–8.7)
RBC # BLD AUTO: 3.86 10*6/MM3 (ref 4.2–5.4)
SODIUM BLD-SCNC: 130 MMOL/L (ref 135–145)
WBC NRBC COR # BLD: 5.41 10*3/MM3 (ref 4.8–10.8)

## 2017-07-18 PROCEDURE — 71020 HC CHEST PA AND LATERAL: CPT

## 2017-07-18 PROCEDURE — 25010000002 CEFTRIAXONE: Performed by: EMERGENCY MEDICINE

## 2017-07-18 PROCEDURE — 80053 COMPREHEN METABOLIC PANEL: CPT | Performed by: FAMILY MEDICINE

## 2017-07-18 PROCEDURE — 85025 COMPLETE CBC W/AUTO DIFF WBC: CPT | Performed by: FAMILY MEDICINE

## 2017-07-18 PROCEDURE — 25010000002 FUROSEMIDE PER 20 MG: Performed by: FAMILY MEDICINE

## 2017-07-18 RX ORDER — FUROSEMIDE 10 MG/ML
40 INJECTION INTRAMUSCULAR; INTRAVENOUS ONCE
Status: COMPLETED | OUTPATIENT
Start: 2017-07-18 | End: 2017-07-18

## 2017-07-18 RX ORDER — LORAZEPAM 0.5 MG/1
0.5 TABLET ORAL EVERY 12 HOURS SCHEDULED
Status: DISCONTINUED | OUTPATIENT
Start: 2017-07-18 | End: 2017-07-20 | Stop reason: HOSPADM

## 2017-07-18 RX ADMIN — DILTIAZEM HYDROCHLORIDE 240 MG: 240 CAPSULE, EXTENDED RELEASE ORAL at 09:23

## 2017-07-18 RX ADMIN — ASPIRIN 81 MG: 81 TABLET ORAL at 09:24

## 2017-07-18 RX ADMIN — LORAZEPAM 0.5 MG: 0.5 TABLET ORAL at 20:46

## 2017-07-18 RX ADMIN — FUROSEMIDE 40 MG: 10 INJECTION, SOLUTION INTRAMUSCULAR; INTRAVENOUS at 12:43

## 2017-07-18 RX ADMIN — Medication 250 MG: at 17:53

## 2017-07-18 RX ADMIN — LUBIPROSTONE 8 MCG: 8 CAPSULE, GELATIN COATED ORAL at 09:23

## 2017-07-18 RX ADMIN — POLYETHYLENE GLYCOL 3350 17 G: 17 POWDER, FOR SOLUTION ORAL at 12:43

## 2017-07-18 RX ADMIN — ATORVASTATIN CALCIUM 10 MG: 10 TABLET, FILM COATED ORAL at 09:22

## 2017-07-18 RX ADMIN — METOPROLOL TARTRATE 100 MG: 100 TABLET ORAL at 09:23

## 2017-07-18 RX ADMIN — DOCUSATE SODIUM -SENNOSIDES 1 TABLET: 50; 8.6 TABLET, COATED ORAL at 09:23

## 2017-07-18 RX ADMIN — LUBIPROSTONE 8 MCG: 8 CAPSULE, GELATIN COATED ORAL at 17:53

## 2017-07-18 RX ADMIN — CEFTRIAXONE 1 G: 1 INJECTION, POWDER, FOR SOLUTION INTRAMUSCULAR; INTRAVENOUS at 09:26

## 2017-07-18 RX ADMIN — LISINOPRIL 40 MG: 20 TABLET ORAL at 09:22

## 2017-07-18 RX ADMIN — METOPROLOL TARTRATE 100 MG: 100 TABLET ORAL at 17:53

## 2017-07-18 RX ADMIN — Medication 250 MG: at 09:22

## 2017-07-18 RX ADMIN — LORAZEPAM 0.5 MG: 0.5 TABLET ORAL at 09:23

## 2017-07-18 RX ADMIN — PANTOPRAZOLE SODIUM 40 MG: 40 TABLET, DELAYED RELEASE ORAL at 09:28

## 2017-07-18 NOTE — PLAN OF CARE
Problem: Patient Care Overview (Adult)  Goal: Plan of Care Review  Outcome: Ongoing (interventions implemented as appropriate)    07/18/17 0952   Coping/Psychosocial Response Interventions   Plan Of Care Reviewed With patient   Patient Care Overview   Progress no change   Outcome Evaluation   Outcome Summary/Follow up Plan Initial nutrition assessment. Pt is confused and unable to answer RD questions regarding usual po intake. No data recorded at this time to assess for intake. Upon record review Pt has lost 14# since 12/21 hospital stay. Current BMI is 22.84. Will start Ensure Enlive to promote extra kcal and protein intake to prevent further loss. Will follow for progress and to determine intake status.

## 2017-07-18 NOTE — PLAN OF CARE
Problem: Patient Care Overview (Adult)  Goal: Plan of Care Review  Outcome: Ongoing (interventions implemented as appropriate)    07/18/17 0952 07/18/17 1344   Coping/Psychosocial Response Interventions   Plan Of Care Reviewed With patient --    Patient Care Overview   Progress no change --    Outcome Evaluation   Outcome Summary/Follow up Plan --  Pt continues to be disoriented to time and place. Spoke with daughter, Manuel, on the phone today she explains to me that Ms. Andrews is baseline confused and needs assistance with feedings at home, she also is usually wheelchair bound and needs assistance to go to the restroom. PT has been consulted to eval and treat prior to me getting the pt up to ambulate etc. The pt is very weak and I do not feel comfortable ambulating her without PT evaluation. Pt AF on tele controlled in the 80-90's. Other VSS. Will cont to monitor pt status.        Goal: Adult Individualization and Mutuality  Outcome: Ongoing (interventions implemented as appropriate)    07/18/17 1344   Individualization   Patient Specific Preferences Needs assistance with feeding.    Patient Specific Goals HR WNL.   Patient Specific Interventions HOB elevated.    Mutuality/Individual Preferences   What Anxieties, Fears or Concerns Do You Have About Your Health or Care? None voiced.   What Questions Do You Have About Your Health or Care? None voiced.   What Information Would Help Us Give You More Personalized Care? None voiced.        Goal: Discharge Needs Assessment  Outcome: Ongoing (interventions implemented as appropriate)    Problem: Arrhythmia/Dysrhythmia (Symptomatic) (Adult)  Goal: Signs and Symptoms of Listed Potential Problems Will be Absent or Manageable (Arrhythmia/Dysrhythmia)  Outcome: Ongoing (interventions implemented as appropriate)    07/18/17 0359   Arrhythmia/Dysrhythmia (Symptomatic)   Problems Assessed (Arrhythmia/Dysrhythmia) all   Problems Present (Arrhythmia/Dysrhythmia) electrophysiological  conduction defect         Problem: Fall Risk (Adult)  Goal: Identify Related Risk Factors and Signs and Symptoms  Outcome: Ongoing (interventions implemented as appropriate)    07/18/17 7529   Fall Risk   Fall Risk: Related Risk Factors age-related changes;confusion/agitation;gait/mobility problems;history of falls;environment unfamiliar   Fall Risk: Signs and Symptoms presence of risk factors       Goal: Absence of Falls  Outcome: Ongoing (interventions implemented as appropriate)    07/18/17 1344   Fall Risk (Adult)   Absence of Falls achieves outcome

## 2017-07-18 NOTE — PROGRESS NOTES
[facility] Medicine Services  INPATIENT PROGRESS NOTE    Length of Stay: 1  Date of Admission: 7/17/2017  Primary Care Physician: Balaji Gordon MD    Subjective   Chief Complaint: Feels better.   No pain   HPI   No shortness of breath.  No nausea.   Has been for xray this morning.     No labs done, lab called, will draw immediately.        Review of Systems     All pertinent negatives and positives are as above. All other systems have been reviewed and are negative unless otherwise stated.     Objective    Temp:  [97.3 °F (36.3 °C)-98.5 °F (36.9 °C)] 98.1 °F (36.7 °C)  Heart Rate:  [] 78  Resp:  [16-20] 18  BP: (107-130)/(60-99) 120/62  Physical Exam   Constitutional: She appears well-developed and well-nourished.   Eyes: Conjunctivae and EOM are normal. Pupils are equal, round, and reactive to light.   Neck: Neck supple.   Cardiovascular: Normal rate.  Exam reveals no gallop and no friction rub.    No murmur heard.  Irregular    Per monitor afib rate controlled.   Pulmonary/Chest: Effort normal and breath sounds normal.   Abdominal: Soft. Bowel sounds are normal. There is no hepatosplenomegaly. There is no tenderness.   Musculoskeletal: Normal range of motion. She exhibits no edema.   Neurological: She is alert. No cranial nerve deficit.   Skin: Skin is warm and dry.   Psychiatric: She has a normal mood and affect. Her behavior is normal.   Nursing note and vitals reviewed.          Results Review:  I have reviewed the labs, radiology results, and diagnostic studies.    Laboratory Data:     Results from last 7 days  Lab Units 07/17/17  0952   WBC 10*3/mm3 8.94   HEMOGLOBIN g/dL 13.7   HEMATOCRIT % 40.1   PLATELETS 10*3/mm3 216          Results from last 7 days  Lab Units 07/17/17  0952   SODIUM mmol/L 129*   POTASSIUM mmol/L 4.6   CHLORIDE mmol/L 99   CO2 mmol/L 20.0*   BUN mg/dL 18   CREATININE mg/dL 0.75   CALCIUM mg/dL 8.9   BILIRUBIN mg/dL 1.0   ALK PHOS U/L 124*      ALT (SGPT) U/L 93*   AST (SGOT) U/L 77*   GLUCOSE mg/dL 93       Culture Data:   Blood Culture   Date Value Ref Range Status   07/17/2017 No growth at less than 24 hours  Preliminary   07/17/2017 No growth at less than 24 hours  Preliminary     Urine Culture   Date Value Ref Range Status   07/17/2017 >100,000 CFU/mL Escherichia coli (A)  Preliminary       Radiology Data:   Imaging Results (last 24 hours)     Procedure Component Value Units Date/Time    XR Chest 1 View [[id number]] Collected:  07/17/17 1156     Updated:  07/17/17 1202    Narrative:       EXAMINATION: XR CHEST 1 VW-. 7/17/2017 12:56 PM EDT     CHEST, ONE VIEW:     HISTORY: Shortness of air     COMPARISON: 07/08/2015     A single frontal chest radiograph was obtained.     FINDINGS:     There is cardiomegaly with permanent cardiac pacemaker observed.     There is bilateral perihilar and lower lobe infiltrates, differential  concerns include pulmonary edema and mild changes of heart failure  correlate with patient presentation.                                     Impression:       1. Cardiomegaly with bilateral perihilar and lobe infiltrates. Pulmonary  edema and congestive heart failure considered.     This report was finalized on 07/17/2017 11:59 by Dr. Rogelio Bedolla MD.    XR Chest PA & Lateral [[id number]] Collected:  07/18/17 0820     Updated:  07/18/17 0826    Narrative:       EXAMINATION: XR CHEST PA AND LATERAL-  7/18/2017 9:20 AM EDT     TWO-VIEW CHEST:      HISTORY: Congestive heart failure      Frontal and lateral projection chest radiograph obtained.     COMPARISON:  07/17/2017 07/08/2014      FINDINGS:     There is cardiomegaly. Permanent cardiac pacemaker noted.     There is perihilar bronchovascular interstitial prominence with patchy  airspace opacities in the lower lobes with bilateral pleural effusions.  Underlying COPD change likely. Differential considerations include  pulmonary venous hypertension and congestive heart failure.      Radiographically, chest appears essentially unchanged.                                                                                       Impression:       1. Cardiomegaly with perihilar/lower lobe infiltrates and pleural  effusions. Differential considerations include congestive heart failure.        This report was finalized on 07/18/2017 08:23 by Dr. Rogelio Bedolla MD.          I have reviewed the patient current medications.     Assessment/Plan     Hospital Problem List     New onset a-fib        Assessment    Paroxysmal atrial fibrillation  Confusion v. Purposefully not interacting with physician resolved  Congestive heart failure. Acute      Plan    IV lasix x 1  Increase activity  Home possible in AM        Chanell Bella DO   07/18/17   10:21 AM

## 2017-07-18 NOTE — PLAN OF CARE
Problem: Patient Care Overview (Adult)  Goal: Plan of Care Review  Outcome: Ongoing (interventions implemented as appropriate)    07/18/17 0359   Coping/Psychosocial Response Interventions   Plan Of Care Reviewed With patient   Patient Care Overview   Progress no change   Outcome Evaluation   Outcome Summary/Follow up Plan Patient disoriented. Bed alarm on for safety. Cardizem gtt off, PO started. Remains in AF but rate controlled. Continue to monitor.        Goal: Adult Individualization and Mutuality  Outcome: Ongoing (interventions implemented as appropriate)  Goal: Discharge Needs Assessment  Outcome: Ongoing (interventions implemented as appropriate)    Problem: Arrhythmia/Dysrhythmia (Symptomatic) (Adult)  Goal: Signs and Symptoms of Listed Potential Problems Will be Absent or Manageable (Arrhythmia/Dysrhythmia)  Outcome: Ongoing (interventions implemented as appropriate)    Problem: Fall Risk (Adult)  Goal: Identify Related Risk Factors and Signs and Symptoms  Outcome: Ongoing (interventions implemented as appropriate)  Goal: Absence of Falls  Outcome: Ongoing (interventions implemented as appropriate)

## 2017-07-19 LAB — BACTERIA SPEC AEROBE CULT: ABNORMAL

## 2017-07-19 PROCEDURE — 97162 PT EVAL MOD COMPLEX 30 MIN: CPT

## 2017-07-19 PROCEDURE — 25010000002 CEFTRIAXONE: Performed by: EMERGENCY MEDICINE

## 2017-07-19 PROCEDURE — 94799 UNLISTED PULMONARY SVC/PX: CPT

## 2017-07-19 PROCEDURE — 94760 N-INVAS EAR/PLS OXIMETRY 1: CPT

## 2017-07-19 PROCEDURE — G8981 BODY POS CURRENT STATUS: HCPCS

## 2017-07-19 PROCEDURE — G8982 BODY POS GOAL STATUS: HCPCS

## 2017-07-19 RX ADMIN — LUBIPROSTONE 8 MCG: 8 CAPSULE, GELATIN COATED ORAL at 18:00

## 2017-07-19 RX ADMIN — DOCUSATE SODIUM -SENNOSIDES 1 TABLET: 50; 8.6 TABLET, COATED ORAL at 11:07

## 2017-07-19 RX ADMIN — Medication 250 MG: at 17:59

## 2017-07-19 RX ADMIN — METOPROLOL TARTRATE 100 MG: 100 TABLET ORAL at 18:00

## 2017-07-19 RX ADMIN — Medication 250 MG: at 11:07

## 2017-07-19 RX ADMIN — HYDROCODONE BITARTRATE AND ACETAMINOPHEN 1 TABLET: 5; 325 TABLET ORAL at 00:50

## 2017-07-19 RX ADMIN — LISINOPRIL 40 MG: 20 TABLET ORAL at 11:05

## 2017-07-19 RX ADMIN — HYDROCODONE BITARTRATE AND ACETAMINOPHEN 1 TABLET: 5; 325 TABLET ORAL at 17:59

## 2017-07-19 RX ADMIN — CEFTRIAXONE 1 G: 1 INJECTION, POWDER, FOR SOLUTION INTRAMUSCULAR; INTRAVENOUS at 11:06

## 2017-07-19 RX ADMIN — ATORVASTATIN CALCIUM 10 MG: 10 TABLET, FILM COATED ORAL at 11:05

## 2017-07-19 RX ADMIN — PANTOPRAZOLE SODIUM 40 MG: 40 TABLET, DELAYED RELEASE ORAL at 11:06

## 2017-07-19 RX ADMIN — LORAZEPAM 0.5 MG: 0.5 TABLET ORAL at 11:06

## 2017-07-19 RX ADMIN — ASPIRIN 81 MG: 81 TABLET ORAL at 11:06

## 2017-07-19 RX ADMIN — LUBIPROSTONE 8 MCG: 8 CAPSULE, GELATIN COATED ORAL at 11:05

## 2017-07-19 RX ADMIN — METOPROLOL TARTRATE 100 MG: 100 TABLET ORAL at 11:05

## 2017-07-19 RX ADMIN — DILTIAZEM HYDROCHLORIDE 240 MG: 240 CAPSULE, EXTENDED RELEASE ORAL at 11:05

## 2017-07-19 RX ADMIN — LORAZEPAM 0.5 MG: 0.5 TABLET ORAL at 21:02

## 2017-07-19 NOTE — PLAN OF CARE
Problem: Patient Care Overview (Adult)  Goal: Plan of Care Review  Outcome: Ongoing (interventions implemented as appropriate)    07/19/17 1722   Coping/Psychosocial Response Interventions   Plan Of Care Reviewed With patient   Patient Care Overview   Progress no change   Outcome Evaluation   Outcome Summary/Follow up Plan PT worked with patient today. Pt slept quit a bit today, no complaints of pain. Pt gets confused and kept insisting that she didn't want to have any debts, she wanted to make sure things were paid. Pt is very [place] and cannot see very well. Will continue to monitor.       Goal: Adult Individualization and Mutuality  Outcome: Ongoing (interventions implemented as appropriate)  Goal: Discharge Needs Assessment  Outcome: Ongoing (interventions implemented as appropriate)    Problem: Arrhythmia/Dysrhythmia (Symptomatic) (Adult)  Goal: Signs and Symptoms of Listed Potential Problems Will be Absent or Manageable (Arrhythmia/Dysrhythmia)  Outcome: Ongoing (interventions implemented as appropriate)    Problem: Fall Risk (Adult)  Goal: Identify Related Risk Factors and Signs and Symptoms  Outcome: Ongoing (interventions implemented as appropriate)  Goal: Absence of Falls  Outcome: Ongoing (interventions implemented as appropriate)    Problem: Pressure Ulcer Risk (Tony Scale) (Adult,Obstetrics,Pediatric)  Goal: Identify Related Risk Factors and Signs and Symptoms  Outcome: Ongoing (interventions implemented as appropriate)  Goal: Skin Integrity  Outcome: Ongoing (interventions implemented as appropriate)

## 2017-07-19 NOTE — PLAN OF CARE
Problem: Patient Care Overview (Adult)  Goal: Plan of Care Review  Outcome: Ongoing (interventions implemented as appropriate)    07/19/17 0403   Coping/Psychosocial Response Interventions   Plan Of Care Reviewed With patient   Patient Care Overview   Progress no change   Outcome Evaluation   Outcome Summary/Follow up Plan VSS, pt disoriented to situation, place and time. Pt c/o pain in legs when positioning in bed, medicated per MAR with relief. AF on telemetry. continue to monitor. PT consult for today.         Problem: Arrhythmia/Dysrhythmia (Symptomatic) (Adult)  Goal: Signs and Symptoms of Listed Potential Problems Will be Absent or Manageable (Arrhythmia/Dysrhythmia)  Outcome: Ongoing (interventions implemented as appropriate)    07/19/17 0403   Arrhythmia/Dysrhythmia (Symptomatic)   Problems Assessed (Arrhythmia/Dysrhythmia) all   Problems Present (Arrhythmia/Dysrhythmia) electrophysiological conduction defect         Problem: Fall Risk (Adult)  Goal: Absence of Falls  Outcome: Ongoing (interventions implemented as appropriate)    07/19/17 0403   Fall Risk (Adult)   Absence of Falls achieves outcome         Problem: Pressure Ulcer Risk (Tony Scale) (Adult,Obstetrics,Pediatric)  Goal: Skin Integrity  Outcome: Ongoing (interventions implemented as appropriate)    07/19/17 0403   Pressure Ulcer Risk (Tony Scale) (Adult,Obstetrics,Pediatric)   Skin Integrity making progress toward outcome

## 2017-07-19 NOTE — THERAPY EVALUATION
Acute Care - Physical Therapy Initial Evaluation  [facility]     Patient Name: Kal Andrews  : 10/24/1926  MRN: [id number]  Today's Date: 2017   Onset of Illness/Injury or Date of Surgery Date: 17  Date of Referral to PT: 17  Referring Physician: Dr Bella      Admit Date: 2017     Visit Dx:    ICD-10-CM ICD-9-CM   1. New onset a-fib I48.91 427.31   2. Acute systolic congestive heart failure I50.21 428.21     428.0   3. Acute UTI (urinary tract infection) N39.0 599.0   4. Chronic obstructive pulmonary disease with acute exacerbation J44.1 491.21   5. Impaired functional mobility, balance, and endurance Z74.09 V49.89     Patient Active Problem List   Diagnosis   • Cardiac pacemaker in situ   • Hypertension   • Paroxysmal SVT (supraventricular tachycardia)   • Sinoatrial node dysfunction   • Venous insufficiency of leg   • Allergic rhinitis   • Gastro-esophageal reflux   • Cerumen debris on tympanic membrane   • Arthritis   • Status post placement of cardiac pacemaker   • Edema   • New onset a-fib     Past Medical History:   Diagnosis Date   • Allergic rhinitis    • Arthritis    • Cerumen debris on tympanic membrane    • Edema    • Gastro-esophageal reflux    • H/O dilation and curettage     Dilation And Curettage Of Uterus - multiple   • Hypertension    • Hypertension, benign    • Pacemaker    • Paroxysmal SVT (supraventricular tachycardia)    • Sinoatrial node dysfunction    • Status post placement of cardiac pacemaker    • Venous insufficiency of leg      Past Surgical History:   Procedure Laterality Date   • APPENDECTOMY     • BREAST BIOPSY     • CARDIAC ELECTROPHYSIOLOGY PROCEDURE N/A 2016    Procedure: PPM generator change - dual;  Surgeon: Luis Armando Brown MD;  Location: [facility] INVASIVE LOCATION;  Service:    • CHOLECYSTECTOMY     • MYRINGOPLASTY W/ PAPER PATCH     • PACEMAKER IMPLANTATION            PT ASSESSMENT (last 72 hours)      PT Evaluation       17 1046  07/17/17 1558    Rehab Evaluation    Document Type evaluation   see MAR  -PB     Subjective Information agree to therapy;complains of;fatigue;weakness  -PB     Patient Effort, Rehab Treatment fair  -PB     General Information    Patient Profile Review yes  -PB     Onset of Illness/Injury or Date of Surgery Date 07/17/17  -PB     Referring Physician Dr Bella  -PB     General Observations awake and alert with nursing, pt had urine incontinence  -PB     Pertinent History Of Current Problem pain all over, confusion; new onset A-fib, CHF, e-coli in urine  -PB     Precautions/Limitations fall precautions  -PB     Prior Level of Function --   typically w/c bound per notes;pt not sure of PLOF  -PB     Equipment Currently Used at Home --   pt states she doesnt know  -PB     Plans/Goals Discussed With patient;agreed upon  -PB     Risks Reviewed patient:;LOB;nausea/vomiting;dizziness;increased discomfort;change in vital signs  -PB     Benefits Reviewed patient:;improve function;increase independence;increase strength;increase balance  -PB     Barriers to Rehab medically complex;previous functional deficit;cognitive status  -PB     Living Environment    Lives With facility resident  -PB facility resident  -KISHA    Living Arrangements assisted living   [facility]  -PB assisted living   [facility]    Clinical Impression    Date of Referral to PT 07/18/17  -PB     PT Diagnosis impaired mobility  -PB     Patient/Family Goals Statement pt unable to state at this time  -PB     Criteria for Skilled Therapeutic Interventions Met yes;treatment indicated  -PB     Pathology/Pathophysiology Noted (Describe Specifically for Each System) musculoskeletal  -PB     Impairments Found (describe specific impairments) gait, locomotion, and balance  -PB     Functional Limitations in Following Categories (Describe Specific Limitations) self-care  -PB     Rehab Potential fair, will monitor progress closely  -PB     Predicted Duration of  Therapy Intervention (days/wks) until D/C  -PB     Pain Assessment    Pain Assessment No/denies pain  -PB     Cognitive Assessment/Intervention    Current Cognitive/Communication Assessment impaired  -PB     Orientation Status oriented to;person  -PB     Follows Commands/Answers Questions 75% of the time;able to follow single-step instructions;needs repetition   [place]  -PB     Personal Safety decreased awareness, need for assist;decreased awareness, need for safety;decreased insight to deficits  -PB     Personal Safety Interventions fall prevention program maintained;gait belt;nonskid shoes/slippers when out of bed;supervised activity  -PB     ROM (Range of Motion)    General ROM Detail BLE AROM WFL, R shoulder impaired 75%, L shoulder impaired 50%  -PB     MMT (Manual Muscle Testing)    General MMT Assessment Detail BLE functionally 3-/5, BUE functioanlly 3-/5  -PB     Bed Mobility, Assessment/Treatment    Bed Mobility, Assistive Device head of bed elevated;bed rails  -PB     Bed Mobility, Roll Left, [place] minimum assist (75% patient effort)  -PB     Bed Mobility, Roll Right, [place] minimum assist (75% patient effort)  -PB     Bed Mob, Supine to Sit, [place] maximum assist (25% patient effort);verbal cues required  -PB     Bed Mob, Sit to Supine, [place] verbal cues required;maximum assist (25% patient effort)  -PB     Bed Mobility, Safety Issues cognitive deficits limit understanding;decreased use of arms for pushing/pulling;decreased use of legs for bridging/pushing  -PB     Bed Mobility, Impairments impaired balance;strength decreased  -PB     Transfer Assessment/Treatment    Transfers, Bed-Chair [place] maximum assist (25% patient effort);verbal cues required;2 person assist required;hand held assist  -PB     Transfers, Chair-Bed [place] maximum assist (25% patient effort);verbal cues required;hand held assist  -PB     Toilet Transfer, [place] maximum assist (25% patient  effort)   assisted in cleaning pt up and changing linens  -PB     Toilet Transfer, Assistive Device bedside commode without drop arms  -PB     Transfer, Safety Issues sequencing ability decreased  -PB     Transfer, Impairments impaired balance;coordination impaired   cognitive deficits  -PB     Gait Assessment/Treatment    Gait, Comment defer due to weakness  -PB     Motor Skills/Interventions    Additional Documentation Balance Skills Training (Group)  -PB     Balance Skills Training    Sitting-Level of Assistance Minimum assistance;Close supervision  -PB     Sitting-Balance Support Right upper extremity supported;Left upper extremity supported;Feet supported  -PB     Positioning and Restraints    Pre-Treatment Position in bed  -PB     Post Treatment Position bed  -PB     In Bed fowlers;call light within reach;encouraged to call for assist;exit alarm on;side rails up x3  -PB       07/17/17 1236       General Information    Equipment Currently Used at Home other (see comments)   pt doesn't know  -CP       User Key  (r) = Recorded By, (t) = Taken By, (c) = Cosigned By    Initials Name Provider Type    CP Aishwarya Hudson, ANA Registered Nurse    PB Rogelio Degroot, PT DPT Physical Therapist    KISHA Elam, MSW           Physical Therapy Education     Title: PT OT SLP Therapies (Active)     Topic: Physical Therapy (Active)     Point: Mobility training (Active)    Learning Progress Summary    Learner Readiness Method Response Comment Documented by Status   Patient Acceptance E NR bed mobility, transfers  07/19/17 1155 Active                      User Key     Initials Effective Dates Name Provider Type Discipline     08/02/16 -  Rogelio Degroot, PT DPT Physical Therapist PT                PT Recommendation and Plan  Anticipated Discharge Disposition: skilled nursing facility  Planned Therapy Interventions: balance training, bed mobility training, home exercise program, patient/family education,  strengthening, transfer training  PT Frequency: daily, 2 times/day, per priority policy  Plan of Care Review  Plan Of Care Reviewed With: patient  Outcome Summary/Follow up Plan: PT eval complete. pt required maxA for bed mobility and transfers. pt had difficulty following commands due to confusion. pt would benefit from continued skilled PT to address weakness and impaired functional mobility. Anticipate D/C to SNF.           IP PT Goals       07/19/17 1153          Bed Mobility PT LTG    Bed Mobility PT LTG, Date Established 07/19/17  -PB      Bed Mobility PT LTG, Time to Achieve by discharge  -PB      Bed Mobility PT LTG, Activity Type all bed mobility  -PB      Bed Mobility PT LTG, [place] Level minimum assist (75% patient effort)  -PB      Bed Mobility PT Goal  LTG, Assist Device bed rails  -PB      Transfer Training PT LTG    Transfer Training PT LTG, Date Established 07/19/17  -PB      Transfer Training PT LTG, Time to Achieve by discharge  -PB      Transfer Training PT LTG, Activity Type bed to chair /chair to bed  -PB      Transfer Training PT LTG, [place] Level minimum assist (75% patient effort)  -PB      Transfer Training PT LTG, Assist Device --   with appropriate UE support  -PB      Strength Goal PT LTG    Strength Goal PT LTG, Date Established 07/19/17  -PB      Strength Goal PT LTG, Time to Achieve by discharge  -PB      Strength Goal PT LTG, Functional Goal perfrom 15 reps BLE seated ther ex  -PB        User Key  (r) = Recorded By, (t) = Taken By, (c) = Cosigned By    Initials Name Provider Type    PB Rogelio Degroot, PT DPT Physical Therapist                Outcome Measures       07/19/17 1046          How much help from another person do you currently need...    Turning from your back to your side while in flat bed without using bedrails? 3  -PB      Moving from lying on back to sitting on the side of a flat bed without bedrails? 2  -PB      Moving to and from a bed to a chair  (including a wheelchair)? 2  -PB      Standing up from a chair using your arms (e.g., wheelchair, bedside chair)? 2  -PB      Climbing 3-5 steps with a railing? 1  -PB      To walk in hospital room? 1  -PB      AM-PAC 6 Clicks Score 11  -PB      Functional Assessment    Outcome Measure Options AM-PAC 6 Clicks Basic Mobility (PT)  -PB        User Key  (r) = Recorded By, (t) = Taken By, (c) = Cosigned By    Initials Name Provider Type    PB Rogelio Degroot, PT DPT Physical Therapist           Time Calculation:         PT Charges       07/19/17 1156          Time Calculation    Start Time 1117   additional time from [id number]  -PB      Stop Time 1151  -PB      Time Calculation (min) 34 min  -PB      PT Received On 07/19/17  -PB      PT Goal Re-Cert Due Date 07/29/17  -PB        User Key  (r) = Recorded By, (t) = Taken By, (c) = Cosigned By    Initials Name Provider Type    PB Rogelio Degroot, PT DPT Physical Therapist          Therapy Charges for Today     Code Description Service Date Service Provider Modifiers Qty    71644171517 HC PT CHNG MAIN POS CURRENT 7/19/2017 Rogelio Degroot PT DPT GP, CL 1    [id number] HC PT CHNG MAIN POS PROJECTED 7/19/2017 Rogelio Degroot, PT DPT GP, CK 1    [id number]  PT EVAL MOD COMPLEXITY 3 7/19/2017 Rogelio Degroot PT DPT GP 1          PT G-Codes  Outcome Measure Options: AM-PAC 6 Clicks Basic Mobility (PT)  Score: 11  Functional Limitation: Changing and maintaining body position  Changing and Maintaining Body Position Current Status (): At least 60 percent but less than 80 percent impaired, limited or restricted  Changing and Maintaining Body Position Goal Status (): At least 40 percent but less than 60 percent impaired, limited or restricted      Rogelio Degroot PT DPT  7/19/2017

## 2017-07-19 NOTE — PLAN OF CARE
Problem: Patient Care Overview (Adult)  Goal: Plan of Care Review  Outcome: Ongoing (interventions implemented as appropriate)    07/19/17 1153   Coping/Psychosocial Response Interventions   Plan Of Care Reviewed With patient   Outcome Evaluation   Outcome Summary/Follow up Plan PT eval complete. pt required maxA for bed mobility and transfers. pt had difficulty following commands due to confusion. pt would benefit from continued skilled PT to address weakness and impaired functional mobility. Anticipate D/C to SNF.          Problem: Inpatient Physical Therapy  Goal: Bed Mobility Goal LTG- PT  Outcome: Ongoing (interventions implemented as appropriate)    07/19/17 1153   Bed Mobility PT LTG   Bed Mobility PT LTG, Date Established 07/19/17   Bed Mobility PT LTG, Time to Achieve by discharge   Bed Mobility PT LTG, Activity Type all bed mobility   Bed Mobility PT LTG, [place] Level minimum assist (75% patient effort)   Bed Mobility PT Goal LTG, Assist Device bed rails       Goal: Transfer Training Goal 1 LTG- PT  Outcome: Ongoing (interventions implemented as appropriate)    07/19/17 1153   Transfer Training PT LTG   Transfer Training PT LTG, Date Established 07/19/17   Transfer Training PT LTG, Time to Achieve by discharge   Transfer Training PT LTG, Activity Type bed to chair /chair to bed   Transfer Training PT LTG, [place] Level minimum assist (75% patient effort)   Transfer Training PT LTG, Assist Device (with appropriate UE support)       Goal: Strength Goal LTG- PT  Outcome: Ongoing (interventions implemented as appropriate)    07/19/17 1153   Strength Goal PT LTG   Strength Goal PT LTG, Date Established 07/19/17   Strength Goal PT LTG, Time to Achieve by discharge   Strength Goal PT LTG, Functional Goal perfrom 15 reps BLE seated ther ex

## 2017-07-19 NOTE — PROGRESS NOTES
[facility] Services  INPATIENT PROGRESS NOTE    Length of Stay: 2  Date of Admission: 7/17/2017  Primary Care Physician: Balaji Gordon MD    Subjective   Chief Complaint: Feels cold  HPI   No nausea.  No pain. No shortness of breath.  Per nursing has been confused.    Review of Systems     All pertinent negatives and positives are as above. All other systems have been reviewed and are negative unless otherwise stated.     Objective    Temp:  [96.9 °F (36.1 °C)-98.9 °F (37.2 °C)] 97.4 °F (36.3 °C)  Heart Rate:  [] 94  Resp:  [16-18] 18  BP: (102-122)/(58-97) 102/62  Physical Exam   Constitutional: She appears well-developed and well-nourished.   HENT:   Head: Normocephalic and atraumatic.   Eyes: Conjunctivae and EOM are normal. Pupils are equal, round, and reactive to light.   Neck: Neck supple.   Cardiovascular: Normal rate and regular rhythm.  Exam reveals no gallop and no friction rub.    No murmur heard.  Pulmonary/Chest: Effort normal and breath sounds normal.   Abdominal: Soft. Bowel sounds are normal. There is no hepatosplenomegaly. There is no tenderness.   Musculoskeletal: Normal range of motion. She exhibits no edema.   Neurological: She is alert. No cranial nerve deficit.   oriented to self   Skin: Skin is warm and dry.   Psychiatric: She has a normal mood and affect. Her behavior is normal.   Nursing note and vitals reviewed.          Results Review:  I have reviewed the labs, radiology results, and diagnostic studies.    Laboratory Data:     Results from last 7 days  Lab Units 07/18/17  1028 07/17/17  0952   WBC 10*3/mm3 5.41 8.94   HEMOGLOBIN g/dL 12.3 13.7   HEMATOCRIT % 36.7* 40.1   PLATELETS 10*3/mm3 244 216          Results from last 7 days  Lab Units 07/18/17  1028 07/17/17  0952   SODIUM mmol/L 130* 129*   POTASSIUM mmol/L 4.6 4.6   CHLORIDE mmol/L 100 99   CO2 mmol/L 16.0* 20.0*   BUN mg/dL 19 18   CREATININE mg/dL 0.67 0.75   CALCIUM mg/dL  8.7 8.9   BILIRUBIN mg/dL 0.7 1.0   ALK PHOS U/L 98 124*   ALT (SGPT) U/L 84* 93*   AST (SGOT) U/L 61* 77*   GLUCOSE mg/dL 125* 93       Culture Data:   Blood Culture   Date Value Ref Range Status   07/17/2017 No growth at 2 days  Preliminary   07/17/2017 No growth at 2 days  Preliminary     Urine Culture   Date Value Ref Range Status   07/17/2017 >100,000 CFU/mL Escherichia coli (A)  Final       Radiology Data:   Imaging Results (last 24 hours)     ** No results found for the last 24 hours. **          I have reviewed the patient current medications.     Assessment/Plan     Hospital Problem List     New onset a-fib          Assessment     Paroxysmal atrial fibrillation  Confusion suspect component of dementia  Congestive heart failure. Acute  E coli urinary tract infection.    Plan    Change to oral antibiotic  Social service consult for SNF    Chair  PT mobilize          Discharge Planning: I expect patient to be discharged to SNF in 1-2 days.    Chanell Bella DO   07/19/17   12:29 PM

## 2017-07-19 NOTE — PROGRESS NOTES
Continued Stay Note  [facility]     Patient Name: Kal Andrews  MRN: [id number]  Today's Date: 7/19/2017    Admit Date: 7/17/2017          Discharge Plan       07/19/17 1426    Case Management/Social Work Plan    Plan SW spoke with PT's daughter who is out of town until Saturday. She states she hopes PT is able to return to [facility] upon dc. SW is unsure if PT will be able to do so, as she has been extremely confused and unable to ambulate. Therapy is recommending SNF placement. PT's daughter has been provided information on [place] facilities, as she does not want PT to return to [place]. PT's daughter is calling local facilities to ask questions and obtain information to make a decision. SW has called [facility] to verify whether or not they can accept PT back, they will come Thursday morning to do an assessment. Will continue to follow and will list PT once daughter decides which facilities to list with or arrange HH if PT is able to return to [facility].     Patient/Family In Agreement With Plan yes              Discharge Codes     None            JARRET Monique

## 2017-07-20 VITALS
SYSTOLIC BLOOD PRESSURE: 115 MMHG | OXYGEN SATURATION: 94 % | HEART RATE: 100 BPM | HEIGHT: 68 IN | TEMPERATURE: 98.7 F | DIASTOLIC BLOOD PRESSURE: 75 MMHG | BODY MASS INDEX: 22.13 KG/M2 | RESPIRATION RATE: 18 BRPM | WEIGHT: 146 LBS

## 2017-07-20 PROCEDURE — 25010000002 CEFTRIAXONE: Performed by: EMERGENCY MEDICINE

## 2017-07-20 PROCEDURE — 97110 THERAPEUTIC EXERCISES: CPT

## 2017-07-20 RX ORDER — LORAZEPAM 0.5 MG/1
0.5 TABLET ORAL DAILY
Qty: 10 TABLET | Refills: 0 | Status: SHIPPED | OUTPATIENT
Start: 2017-07-20

## 2017-07-20 RX ADMIN — HYDROCODONE BITARTRATE AND ACETAMINOPHEN 1 TABLET: 5; 325 TABLET ORAL at 09:45

## 2017-07-20 RX ADMIN — LISINOPRIL 40 MG: 20 TABLET ORAL at 09:45

## 2017-07-20 RX ADMIN — ASPIRIN 81 MG: 81 TABLET ORAL at 09:45

## 2017-07-20 RX ADMIN — ATORVASTATIN CALCIUM 10 MG: 10 TABLET, FILM COATED ORAL at 09:45

## 2017-07-20 RX ADMIN — LUBIPROSTONE 8 MCG: 8 CAPSULE, GELATIN COATED ORAL at 08:11

## 2017-07-20 RX ADMIN — PANTOPRAZOLE SODIUM 40 MG: 40 TABLET, DELAYED RELEASE ORAL at 08:10

## 2017-07-20 RX ADMIN — DILTIAZEM HYDROCHLORIDE 240 MG: 240 CAPSULE, EXTENDED RELEASE ORAL at 09:45

## 2017-07-20 RX ADMIN — DOCUSATE SODIUM -SENNOSIDES 1 TABLET: 50; 8.6 TABLET, COATED ORAL at 08:10

## 2017-07-20 RX ADMIN — LORAZEPAM 0.5 MG: 0.5 TABLET ORAL at 08:10

## 2017-07-20 RX ADMIN — METOPROLOL TARTRATE 100 MG: 100 TABLET ORAL at 11:09

## 2017-07-20 RX ADMIN — Medication 250 MG: at 08:10

## 2017-07-20 NOTE — PLAN OF CARE
Problem: Patient Care Overview (Adult)  Goal: Plan of Care Review  Outcome: Ongoing (interventions implemented as appropriate)    07/20/17 0354   Coping/Psychosocial Response Interventions   Plan Of Care Reviewed With patient   Patient Care Overview   Progress no change   Outcome Evaluation   Outcome Summary/Follow up Plan VSS, no c/o pain this shift, pt has been more calm tonight, and stated appreciation when assisting with comfort. continue to monitor.         Problem: Arrhythmia/Dysrhythmia (Symptomatic) (Adult)  Goal: Signs and Symptoms of Listed Potential Problems Will be Absent or Manageable (Arrhythmia/Dysrhythmia)  Outcome: Ongoing (interventions implemented as appropriate)    07/20/17 0354   Arrhythmia/Dysrhythmia (Symptomatic)   Problems Assessed (Arrhythmia/Dysrhythmia) all   Problems Present (Arrhythmia/Dysrhythmia) electrophysiological conduction defect         Problem: Fall Risk (Adult)  Goal: Identify Related Risk Factors and Signs and Symptoms  Outcome: Outcome(s) achieved Date Met:  07/20/17 07/18/17 0359   Fall Risk   Fall Risk: Related Risk Factors age-related changes;confusion/agitation;gait/mobility problems;history of falls;environment unfamiliar   Fall Risk: Signs and Symptoms presence of risk factors       Goal: Absence of Falls  Outcome: Ongoing (interventions implemented as appropriate)    07/20/17 0354   Fall Risk (Adult)   Absence of Falls achieves outcome         Problem: Pressure Ulcer Risk (Tony Scale) (Adult,Obstetrics,Pediatric)  Goal: Identify Related Risk Factors and Signs and Symptoms  Outcome: Outcome(s) achieved Date Met:  07/20/17 07/19/17 1722   Pressure Ulcer Risk (Tony Scale)   Related Risk Factors (Pressure Ulcer Risk (Tony Scale)) age extremes       Goal: Skin Integrity  Outcome: Ongoing (interventions implemented as appropriate)    07/20/17 0354   Pressure Ulcer Risk (Tony Scale) (Adult,Obstetrics,Pediatric)   Skin Integrity making progress toward outcome

## 2017-07-20 NOTE — PLAN OF CARE
Problem: Patient Care Overview (Adult)  Goal: Plan of Care Review  Outcome: Ongoing (interventions implemented as appropriate)    07/20/17 1025   Coping/Psychosocial Response Interventions   Plan Of Care Reviewed With patient   Patient Care Overview   Progress unable to show any progress toward functional goals   Outcome Evaluation   Outcome Summary/Follow up Plan Pt. not participating with therapy. Pt. kept eyes closed the entire time and would not follow any directions. Assisted pt. with PROM to all extremities, but pt. made no effort in helping, though pt. moves all extremities while lying in bed. Pt moans throughout tx. Pt will need long term care following discharge from hospital unless she begins to participate.

## 2017-07-20 NOTE — THERAPY TREATMENT NOTE
Acute Care - Physical Therapy Treatment Note  [facility]     Patient Name: Kal Andrews  : 10/24/1926  MRN: [id number]  Today's Date: 2017  Onset of Illness/Injury or Date of Surgery Date: 17  Date of Referral to PT: 17  Referring Physician: Dr Bella    Admit Date: 2017    Visit Dx:    ICD-10-CM ICD-9-CM   1. New onset a-fib I48.91 427.31   2. Acute systolic congestive heart failure I50.21 428.21     428.0   3. Acute UTI (urinary tract infection) N39.0 599.0   4. Chronic obstructive pulmonary disease with acute exacerbation J44.1 491.21   5. Impaired functional mobility, balance, and endurance Z74.09 V49.89     Patient Active Problem List   Diagnosis   • Cardiac pacemaker in situ   • Hypertension   • Paroxysmal SVT (supraventricular tachycardia)   • Sinoatrial node dysfunction   • Venous insufficiency of leg   • Allergic rhinitis   • Gastro-esophageal reflux   • Cerumen debris on tympanic membrane   • Arthritis   • Status post placement of cardiac pacemaker   • Edema   • New onset a-fib               Adult Rehabilitation Note       17 1025          Rehab Assessment/Intervention    Discipline physical therapy assistant  -      Document Type therapy note (daily note)  -      Subjective Information agree to therapy;complains of;pain  -      Treatment Not Performed, Comment Pt. kept her eyes closed and would not assist with any exercise. Pt. would moan and ask questions, but would not follow instructions.  -MF      Precautions/Limitations fall precautions   exit alarm  -MF      Recorded by [MF] Julia Rosado PTA      Pain Assessment    Pain Assessment Bacon-Baker FACES  -      Bacon-Baker FACES Pain Rating 4  -      Pain Type Chronic pain  -      Pain Location Generalized  -      Pain Frequency Intermittent  -      Pain Intervention(s) Repositioned  -MF      Recorded by [MF] Julia Rosado PTA      Bed Mobility, Assessment/Treatment    Bed Mobility, Comment Pt. not  participating with exercises. Not safe to attempt mobility at this time.  -MF      Recorded by [] Julia Rosado PTA      Therapy Exercises    Bilateral Lower Extremities PROM:;20 reps;supine  -MF      Bilateral Upper Extremity PROM:;20 reps;supine  -MF      Recorded by [] Julia Rosado PTA      Positioning and Restraints    Pre-Treatment Position in bed  -MF      Post Treatment Position bed  -MF      In Bed fowlers;call light within reach;side rails up x3;exit alarm on  -      Recorded by [] Julia Rosado PTA        User Key  (r) = Recorded By, (t) = Taken By, (c) = Cosigned By    Initials Name Effective Dates     Julia Rosado PTA 08/02/16 -                 IP PT Goals       07/19/17 1153          Bed Mobility PT LTG    Bed Mobility PT LTG, Date Established 07/19/17  -PB      Bed Mobility PT LTG, Time to Achieve by discharge  -PB      Bed Mobility PT LTG, Activity Type all bed mobility  -PB      Bed Mobility PT LTG, Chauncey Level minimum assist (75% patient effort)  -PB      Bed Mobility PT Goal  LTG, Assist Device bed rails  -PB      Transfer Training PT LTG    Transfer Training PT LTG, Date Established 07/19/17  -PB      Transfer Training PT LTG, Time to Achieve by discharge  -PB      Transfer Training PT LTG, Activity Type bed to chair /chair to bed  -PB      Transfer Training PT LTG, Chauncey Level minimum assist (75% patient effort)  -PB      Transfer Training PT LTG, Assist Device --   with appropriate UE support  -PB      Strength Goal PT LTG    Strength Goal PT LTG, Date Established 07/19/17  -PB      Strength Goal PT LTG, Time to Achieve by discharge  -PB      Strength Goal PT LTG, Functional Goal perfrom 15 reps BLE seated ther ex  -PB        User Key  (r) = Recorded By, (t) = Taken By, (c) = Cosigned By    Initials Name Provider Type    LAURE Degroot PT DPT Physical Therapist          Physical Therapy Education     Title: PT OT SLP Therapies (Active)      Topic: Physical Therapy (Active)     Point: Mobility training (Active)    Learning Progress Summary    Learner Readiness Method Response Comment Documented by Status   Patient Acceptance E NR bed mobility, transfers  07/19/17 1155 Active               Point: Home exercise program (Active)    Learning Progress Summary    Learner Readiness Method Response Comment Documented by Status   Patient Nonacceptance E NL Educated pt. on benefits of activity.  07/20/17 1047 Active                      User Key     Initials Effective Dates Name Provider Type Discipline     08/02/16 -  Julia Rosado PTA Physical Therapy Assistant PT     08/02/16 -  Rogelio Degroot, BEBO DPT Physical Therapist PT                    PT Recommendation and Plan  Anticipated Discharge Disposition: skilled nursing facility  Planned Therapy Interventions: balance training, bed mobility training, home exercise program, patient/family education, strengthening, transfer training  PT Frequency: daily, 2 times/day, per priority policy  Plan of Care Review  Plan Of Care Reviewed With: patient  Progress: unable to show any progress toward functional goals  Outcome Summary/Follow up Plan: Pt. not participating with therapy. Pt. kept eyes closed the entire time and would not follow any directions. Assisted pt. with PROM to all extremities, but pt. made no effort in helping, though pt. moves all extremities while lying in bed. Pt moans throughout tx. Pt will need long term care following discharge from hospital unless she begins to participate.          Outcome Measures       07/20/17 1025 07/19/17 1046       How much help from another person do you currently need...    Turning from your back to your side while in flat bed without using bedrails? 1  -MF 3  -PB     Moving from lying on back to sitting on the side of a flat bed without bedrails? 1  -MF 2  -PB     Moving to and from a bed to a chair (including a wheelchair)? 1  -MF 2  -PB     Standing  up from a chair using your arms (e.g., wheelchair, bedside chair)? 1  -MF 2  -PB     Climbing 3-5 steps with a railing? 1  -MF 1  -PB     To walk in hospital room? 1  -MF 1  -PB     AM-PAC 6 Clicks Score 6  -MF 11  -PB     Functional Assessment    Outcome Measure Options AM-PAC 6 Clicks Basic Mobility (PT)  -MF AM-PAC 6 Clicks Basic Mobility (PT)  -PB       User Key  (r) = Recorded By, (t) = Taken By, (c) = Cosigned By    Initials Name Provider Type    MARIAN Rosado PTA Physical Therapy Assistant    PB Rogelio Degroot, PT DPT Physical Therapist           Time Calculation:         PT Charges       07/20/17 1048          Time Calculation    Start Time 1025  -      Stop Time 1042  -      Time Calculation (min) 17 min  -      PT Non-Billable Time (min) 0 min  -      PT Received On 07/20/17  -      PT Goal Re-Cert Due Date 07/29/17  -      Time Calculation- PT    Total Timed Code Minutes- PT 17 minute(s)  -        User Key  (r) = Recorded By, (t) = Taken By, (c) = Cosigned By    Initials Name Provider Type     Julia Rosado PTA Physical Therapy Assistant          Therapy Charges for Today     Code Description Service Date Service Provider Modifiers Qty    [id number] HC PT THER PROC EA 15 MIN 7/20/2017 Julia Rosado PTA GP 1          PT G-Codes  Outcome Measure Options: AM-PAC 6 Clicks Basic Mobility (PT)  Score: 11  Functional Limitation: Changing and maintaining body position  Changing and Maintaining Body Position Current Status (): At least 60 percent but less than 80 percent impaired, limited or restricted  Changing and Maintaining Body Position Goal Status (): At least 40 percent but less than 60 percent impaired, limited or restricted    Julia Rosado PTA  7/20/2017

## 2017-07-20 NOTE — PROGRESS NOTES
Continued Stay Note  [facility]     Patient Name: Kal Andrews  MRN: [id number]  Today's Date: 7/20/2017    Admit Date: 7/17/2017          Discharge Plan       07/20/17 1102    Case Management/Social Work Plan    Plan [facility] staff have come to evaluate PT for potential return to assisted living. Upon assessment, it has been determined by [facility] staff that they are not able to meet PT's needs and they cannot accept her back at this time. SW spoke with PT's daughter earlier this morning and she advised that she no longer wants to consider the [place] facilities and that she would prefer PT to return to [place]. The only reason she had previously stated that she did not want PT to return was because of the location and it not being convenient for her. She has agreed to have PT listed with [facility] again. PT has been listed. Will await bed offer or denial.     Patient/Family In Agreement With Plan yes              Discharge Codes     None            JARRET Monique

## 2017-07-20 NOTE — PROGRESS NOTES
Continued Stay Note  [facility]     Patient Name: Kal Andrews  MRN: [id number]  Today's Date: 7/20/2017    Admit Date: 7/17/2017          Discharge Plan       07/20/17 1326    Case Management/Social Work Plan    Plan PT has been accepted to [facility] and may dc to SNF when medically ready. [phone] phone [phone] fax. [facility] is on standby to transport.     Patient/Family In Agreement With Plan yes      07/20/17 1102    Case Management/Social Work Plan    Plan [facility] staff have come to evaluate PT for potential return to assisted living. Upon assessment, it has been determined by [facility] staff that they are not able to meet PT's needs and they cannot accept her back at this time. SW spoke with PT's daughter earlier this morning and she advised that she no longer wants to consider the [place] facilities and that she would prefer PT to return to [facility]. The only reason she had previously stated that she did not want PT to return was because of the location and it not being convenient for her. She has agreed to have PT listed with [facility] again. PT has been listed. Will await bed offer or denial.     Patient/Family In Agreement With Plan yes              Discharge Codes     None        Expected Discharge Date and Time     Expected Discharge Date Expected Discharge Time    Jul 20, 2017             JARRET Monique

## 2017-07-20 NOTE — DISCHARGE PLACEMENT REQUEST
"Kal Andrews (89 y.o. Female)     Date of Birth Social Security Number Address Home Phone MRN    10/24/1926  [address]  [address] [address] [phone] [id number]    Oriental orthodox Marital Status          None        Admission Date Admission Type Admitting Provider Attending Provider Department, Room/Bed    7/17/17 Emergency Chanell Bella DO Horn, Frances Marie, DO [facility] 4B, 410/1    Discharge Date Discharge Disposition Discharge Destination                      Attending Provider: Chanell Bella DO     Allergies:  Claritin [Loratadine], Hctz [Hydrochlorothiazide]    Isolation:  None   Infection:  None   Code Status:  Conditional    Ht:  68\" (172.7 cm)   Wt:  146 lb (66.2 kg)    Admission Cmt:  None   Principal Problem:  None                Active Insurance as of 7/17/2017     Primary Coverage     Payor Plan Insurance Group Employer/Plan Group    MEDICARE MEDICARE A & B      Payor Plan Address Payor Plan Phone Number Effective From Effective To    [address] [phone] 10/1/[address]     [place] [address]       Subscriber Name Subscriber Birth Date Member ID       KAL ANDREWS 10/24/1926 [id number]           Secondary Coverage     Payor Plan Insurance Group Employer/Plan Group    AAR MED SUPP AAR HEALTH CARE OPTIONS      Payor Plan Address Payor Plan Phone Number Effective From Effective To    [facility] [phone] 1/1/2016     [address]       [place] [address]       Subscriber Name Subscriber Birth Date Member ID       KAL ANDREWS 10/24/1926 [id number]                 Emergency Contacts      (Rel.) Home Phone Work Phone Mobile Phone    Alexey Renee (Daughter) [phone] -- --               History & Physical      Chanell Bella DO at 7/17/2017 12:29 PM              [facility] Medicine Services  HISTORY AND PHYSICAL    Date of Admission: 7/17/2017  Primary Care Physician: Balaji Gordon, " MD    Subjective     Chief Complaint: At the time of my examination patient was uncooperative.    The only answer she would give was that she hurt all over.       History of Present Illness  Unable to obtain, patient uncooperative.           Review of Systems   Unable to obtain, patient uncooperative.     Past Medical History:   Past Medical History:   Diagnosis Date   • Allergic rhinitis    • Arthritis    • Cerumen debris on tympanic membrane    • Edema    • Gastro-esophageal reflux    • H/O dilation and curettage     Dilation And Curettage Of Uterus - multiple   • Hypertension    • Hypertension, benign    • Pacemaker    • Paroxysmal SVT (supraventricular tachycardia)    • Sinoatrial node dysfunction    • Status post placement of cardiac pacemaker    • Venous insufficiency of leg        Past Surgical History:  Past Surgical History:   Procedure Laterality Date   • APPENDECTOMY     • BREAST BIOPSY     • CARDIAC ELECTROPHYSIOLOGY PROCEDURE N/A 2016    Procedure: PPM generator change - dual;  Surgeon: Luis Armando Brown MD;  Location: [facility] CATH INVASIVE LOCATION;  Service:    • CHOLECYSTECTOMY     • MYRINGOPLASTY W/ PAPER PATCH     • PACEMAKER IMPLANTATION         Social History: Per records does not smoke or drink.  When I asked these questions she replied yes to every question    Family History: Father  with CAD.  Mother , ? Cause.    Allergies:  Allergies   Allergen Reactions   • Claritin [Loratadine]      Unknown reaction-reported from facility   • Hctz [Hydrochlorothiazide]      Unknown reaction reported from facility       Medications:  Prior to Admission medications    Medication Sig Start Date End Date Taking? Authorizing Provider   acetaminophen (TYLENOL) 500 MG tablet Take 500 mg by mouth 4 (Four) Times a Day As Needed for Mild Pain (1-3).   Yes Historical Provider, MD   aspirin 81 MG EC tablet Take 81 mg by mouth Daily.   Yes Historical Provider, MD andrestialivia DIEHL (CARDIZEM CD)  "120 MG 24 hr capsule Take 120 mg by mouth Daily. 9/10/16  Yes Historical Provider, MD   glucosamine sulfate 500 MG capsule capsule Take 1,000 mg by mouth Daily.   Yes Historical Provider, MD   lisinopril (PRINIVIL,ZESTRIL) 40 MG tablet Take 40 mg by mouth Daily.   Yes Historical Provider, MD   LORazepam (ATIVAN) 0.5 MG tablet Take 0.5 mg by mouth Daily.   Yes Historical Provider, MD   lubiprostone (AMITIZA) 8 MCG capsule Take 8 mcg by mouth 2 (Two) Times a Day With Meals.   Yes Historical Provider, MD   metoprolol tartrate (LOPRESSOR) 100 MG tablet Take 100 mg by mouth 2 (Two) Times a Day. 9/16/16  Yes Historical Provider, MD   Multiple Vitamins-Minerals (EYE VITAMINS) capsule Take 1 capsule by mouth Daily.   Yes Historical Provider, MD   Multiple Vitamins-Minerals (ICAPS PO) Take 2 tablets by mouth Daily.   Yes Historical Provider, MD   Multiple Vitamins-Minerals (MULTIVITAMIN ADULT PO) Take 1 tablet by mouth Daily.   Yes Historical Provider, MD   omeprazole (priLOSEC) 20 MG capsule Take 20 mg by mouth 2 (Two) Times a Day.   Yes Historical Provider, MD   polyethylene glycol (MIRALAX) powder Take 17 g by mouth Daily. 9/7/16  Yes Historical Provider, MD   pravastatin (PRAVACHOL) 40 MG tablet Take 40 mg by mouth Every Night. 9/16/16  Yes Historical Provider, MD   saccharomyces boulardii (FLORASTOR) 250 MG capsule Take 250 mg by mouth 2 (Two) Times a Day.   Yes Historical Provider, MD   sennosides-docusate sodium (SENOKOT-S) 8.6-50 MG tablet Take 1 tablet by mouth Daily.   Yes Historical Provider, MD   acetaminophen (TYLENOL) 650 MG 8 hr tablet Take 650 mg by mouth 2 (Two) Times a Day.  7/17/17  Historical Provider, MD       Objective     Vital Signs: /94 (BP Location: Right arm, Patient Position: Lying)  Pulse (!) 134  Temp 98 °F (36.7 °C) (Axillary)   Resp 16  Ht 66\" (167.6 cm)  Wt 162 lb (73.5 kg)  SpO2 96%  BMI 26.15 kg/m2  Physical Exam   Constitutional: She appears well-developed and well-nourished.  "   HENT:   Head: Normocephalic and atraumatic.   Eyes: Conjunctivae and EOM are normal. Pupils are equal, round, and reactive to light.   Mild lid edema   Neck: Normal range of motion. Neck supple. No JVD present.   Cardiovascular: Normal heart sounds and intact distal pulses.  Exam reveals no gallop and no friction rub.    No murmur heard.  Irregular, rapid   Pulmonary/Chest: Effort normal and breath sounds normal.   Abdominal: Soft. Bowel sounds are normal. There is no hepatosplenomegaly. There is no tenderness.   Musculoskeletal: Normal range of motion. She exhibits edema (mild).   Neurological: No cranial nerve deficit.   Arousable.  Will not answer questions with anything other than yes.    Skin: Skin is warm and dry.   Psychiatric:   Irritable.    Nursing note and vitals reviewed.        Results Reviewed:  Lab Results (last 24 hours)     Procedure Component Value Units Date/Time    Blood Gas, Arterial [[id number]]  (Abnormal) Collected:  07/17/17 0950    Specimen:  Arterial Blood Updated:  07/17/17 0952     Site Arterial: right brachial     Orestes's Test --      Documented in Rapid Comm        pH, Arterial 7.441 pH units      pCO2, Arterial 25.8 (L) mm Hg      pO2, Arterial 83.7 mm Hg      HCO3, Arterial 17.2 (L) mmol/L      Base Excess, Arterial -5.1 (L) mmol/L      O2 Saturation, Arterial 96.8 %      O2 Saturation Calculated 96.8 %      Barometric Pressure for Blood Gas -- mmHg       Component not reported at this site.        Modality Room air    Narrative:       Serial Number: [id number]    : [id number]    CBC & Differential [[id number]] Collected:  07/17/17 0952    Specimen:  Blood Updated:  07/17/17 0958    Narrative:       The following orders were created for panel order CBC & Differential.  Procedure                               Abnormality         Status                     ---------                               -----------         ------                     CBC Auto Differential[[id number]]         Abnormal            Final result                 Please view results for these tests on the individual orders.    CBC Auto Differential [[id number]]  (Abnormal) Collected:  07/17/17 0952    Specimen:  Blood Updated:  07/17/17 0958     WBC 8.94 10*3/mm3      RBC 4.23 10*6/mm3      Hemoglobin 13.7 g/dL      Hematocrit 40.1 %      MCV 94.8 fL      MCH 32.4 (H) pg      MCHC 34.2 g/dL      RDW 14.8 %      RDW-SD 49.7 fl      MPV 10.5 fL      Platelets 216 10*3/mm3      Neutrophil % 74.8 %      Lymphocyte % 13.9 (L) %      Monocyte % 11.1 %      Eosinophil % 0.1 %      Basophil % 0.1 %      Neutrophils, Absolute 6.69 10*3/mm3      Lymphocytes, Absolute 1.24 10*3/mm3      Monocytes, Absolute 0.99 10*3/mm3      Eosinophils, Absolute 0.01 10*3/mm3      Basophils, Absolute 0.01 10*3/mm3     Blood Culture [[id number]] Collected:  07/17/17 0944    Specimen:  Blood from Arm, Right Updated:  07/17/17 1002    Blood Culture [[id number]] Collected:  07/17/17 0915    Specimen:  Blood from Arm, Right Updated:  07/17/17 1003    Lactic Acid, Plasma [[id number]]  (Normal) Collected:  07/17/17 0952    Specimen:  Blood Updated:  07/17/17 1005     Lactate 1.4 mmol/L     Comprehensive Metabolic Panel [[id number]]  (Abnormal) Collected:  07/17/17 0952    Specimen:  Blood Updated:  07/17/17 1006     Glucose 93 mg/dL      BUN 18 mg/dL      Creatinine 0.75 mg/dL      Sodium 129 (L) mmol/L      Potassium 4.6 mmol/L      Chloride 99 mmol/L      CO2 20.0 (L) mmol/L      Calcium 8.9 mg/dL      Total Protein 6.7 g/dL      Albumin 3.90 g/dL      ALT (SGPT) 93 (H) U/L      AST (SGOT) 77 (H) U/L      Alkaline Phosphatase 124 (H) U/L      Total Bilirubin 1.0 mg/dL      eGFR Non African Amer 73 mL/min/1.73      Globulin 2.8 gm/dL      A/G Ratio 1.4 g/dL      BUN/Creatinine Ratio 24.0     Anion Gap 10.0 mmol/L     Narrative:       The MDRD GFR formula is only valid for adults with stable renal function between ages 18 and 70.    Protime-INR [[id number]]   (Abnormal) Collected:  07/17/17 0952    Specimen:  Blood Updated:  07/17/17 1011     Protime 14.7 (H) Seconds      INR 1.11 (H)    aPTT [[id number]]  (Normal) Collected:  07/17/17 0952    Specimen:  Blood Updated:  07/17/17 1011     PTT 29.1 seconds     D-dimer, Quantitative [[id number]]  (Abnormal) Collected:  07/17/17 0952    Specimen:  Blood Updated:  07/17/17 1011     D-Dimer, Quantitative 1.48 (H) mg/L (FEU)     Narrative:       Reference Range is 0-0.50 mg/L FEU. However, results <0.50 mg/L FEU tends to rule out DVT or PE. Results >0.50 mg/L FEU are not useful in predicting absence or presence of DVT or PE.    BNP [[id number]]  (Abnormal) Collected:  07/17/17 0952    Specimen:  Blood Updated:  07/17/17 1014     proBNP 83566.0 (H) pg/mL     Urine Culture [[id number]] Collected:  07/17/17 1006    Specimen:  Urine from Urine, Catheter Updated:  07/17/17 1025    Urinalysis With / Culture If Indicated [[id number]]  (Abnormal) Collected:  07/17/17 1006    Specimen:  Urine from Urine, Catheter Updated:  07/17/17 1040     Color, UA Dark Yellow (A)     Appearance, UA Turbid (A)     pH, UA 6.0     Specific Gravity, UA 1.022     Glucose, UA Negative     Ketones, UA 15 mg/dL (1+) (A)     Bilirubin, UA Negative     Blood, UA Large (3+) (A)     Protein, UA >=300 mg/dL (3+) (A)     Leuk Esterase, UA Large (3+) (A)     Nitrite, UA Positive (A)     Urobilinogen, UA 1.0 E.U./dL    Urinalysis, Microscopic Only [[id number]]  (Abnormal) Collected:  07/17/17 1006    Specimen:  Urine from Urine, Catheter Updated:  07/17/17 1040     RBC, UA Too Numerous to Count (A) /HPF      WBC, UA Too Numerous to Count (A) /HPF      Bacteria, UA 3+ (A) /HPF      Squamous Epithelial Cells, UA 3-6 (A) /HPF      Methodology Automated Microscopy        Imaging Results (last 24 hours)     Procedure Component Value Units Date/Time    XR Chest 1 View [[id number]] Collected:  07/17/17 1156     Updated:  07/17/17 1202    Narrative:       EXAMINATION: XR CHEST 1  VW-. 7/17/2017 12:56 PM EDT     CHEST, ONE VIEW:     HISTORY: Shortness of air     COMPARISON: 07/08/2015     A single frontal chest radiograph was obtained.     FINDINGS:     There is cardiomegaly with permanent cardiac pacemaker observed.     There is bilateral perihilar and lower lobe infiltrates, differential  concerns include pulmonary edema and mild changes of heart failure  correlate with patient presentation.                                     Impression:       1. Cardiomegaly with bilateral perihilar and lobe infiltrates. Pulmonary  edema and congestive heart failure considered.     This report was finalized on 07/17/2017 11:59 by Dr. Rogelio Bedolla MD.          I have personally reviewed and interpreted the radiology studies and ECG obtained at time of admission.     Assessment / Plan     Assessment & Plan  Hospital Problem List     New onset a-fib        Assessment         Paroxysmal atrial fibrillation  Confusion v. Purposefully not interacting with physician  Congestive heart failure. Acute      Plan    Admit  IV cardizem continued  Wean as able   Increase oral cardizem  Will reassess after she rests some.   serial troponin x 3        Code Status: DNR  Health care surrogate not known  Patient does not say  Lives in an assisted living       I discussed the patients findings and my recommendations with patient    Estimated length of stay 3 days    Chanell Bella DO   07/17/17   12:29 PM               Electronically signed by Chanell Bella DO at 7/17/2017 12:41 PM           Physician Progress Notes (last 72 hours) (Notes from 7/17/2017 10:54 AM through 7/20/2017 10:54 AM)      Chanell Bella DO at 7/18/2017 10:21 AM  Version 1 of 1             [facility] Medicine Services  INPATIENT PROGRESS NOTE    Length of Stay: 1  Date of Admission: 7/17/2017  Primary Care Physician: Balaji Gordon MD    Subjective   Chief Complaint: Feels better.   No pain   HPI   No  shortness of breath.  No nausea.   Has been for xray this morning.     No labs done, lab called, will draw immediately.        Review of Systems     All pertinent negatives and positives are as above. All other systems have been reviewed and are negative unless otherwise stated.     Objective    Temp:  [97.3 °F (36.3 °C)-98.5 °F (36.9 °C)] 98.1 °F (36.7 °C)  Heart Rate:  [] 78  Resp:  [16-20] 18  BP: (107-130)/(60-99) 120/62  Physical Exam   Constitutional: She appears well-developed and well-nourished.   Eyes: Conjunctivae and EOM are normal. Pupils are equal, round, and reactive to light.   Neck: Neck supple.   Cardiovascular: Normal rate.  Exam reveals no gallop and no friction rub.    No murmur heard.  Irregular    Per monitor afib rate controlled.   Pulmonary/Chest: Effort normal and breath sounds normal.   Abdominal: Soft. Bowel sounds are normal. There is no hepatosplenomegaly. There is no tenderness.   Musculoskeletal: Normal range of motion. She exhibits no edema.   Neurological: She is alert. No cranial nerve deficit.   Skin: Skin is warm and dry.   Psychiatric: She has a normal mood and affect. Her behavior is normal.   Nursing note and vitals reviewed.          Results Review:  I have reviewed the labs, radiology results, and diagnostic studies.    Laboratory Data:     Results from last 7 days  Lab Units 07/17/17  0952   WBC 10*3/mm3 8.94   HEMOGLOBIN g/dL 13.7   HEMATOCRIT % 40.1   PLATELETS 10*3/mm3 216          Results from last 7 days  Lab Units 07/17/17  0952   SODIUM mmol/L 129*   POTASSIUM mmol/L 4.6   CHLORIDE mmol/L 99   CO2 mmol/L 20.0*   BUN mg/dL 18   CREATININE mg/dL 0.75   CALCIUM mg/dL 8.9   BILIRUBIN mg/dL 1.0   ALK PHOS U/L 124*   ALT (SGPT) U/L 93*   AST (SGOT) U/L 77*   GLUCOSE mg/dL 93       Culture Data:   Blood Culture   Date Value Ref Range Status   07/17/2017 No growth at less than 24 hours  Preliminary   07/17/2017 No growth at less than 24 hours  Preliminary     Urine  Culture   Date Value Ref Range Status   07/17/2017 >100,000 CFU/mL Escherichia coli (A)  Preliminary       Radiology Data:   Imaging Results (last 24 hours)     Procedure Component Value Units Date/Time    XR Chest 1 View [[id number]] Collected:  07/17/17 1156     Updated:  07/17/17 1202    Narrative:       EXAMINATION: XR CHEST 1 VW-. 7/17/2017 12:56 PM EDT     CHEST, ONE VIEW:     HISTORY: Shortness of air     COMPARISON: 07/08/2015     A single frontal chest radiograph was obtained.     FINDINGS:     There is cardiomegaly with permanent cardiac pacemaker observed.     There is bilateral perihilar and lower lobe infiltrates, differential  concerns include pulmonary edema and mild changes of heart failure  correlate with patient presentation.                                     Impression:       1. Cardiomegaly with bilateral perihilar and lobe infiltrates. Pulmonary  edema and congestive heart failure considered.     This report was finalized on 07/17/2017 11:59 by Dr. Rogelio Bedolla MD.    XR Chest PA & Lateral [[id number]] Collected:  07/18/17 0820     Updated:  07/18/17 0826    Narrative:       EXAMINATION: XR CHEST PA AND LATERAL-  7/18/2017 9:20 AM EDT     TWO-VIEW CHEST:      HISTORY: Congestive heart failure      Frontal and lateral projection chest radiograph obtained.     COMPARISON:  07/17/2017 07/08/2014      FINDINGS:     There is cardiomegaly. Permanent cardiac pacemaker noted.     There is perihilar bronchovascular interstitial prominence with patchy  airspace opacities in the lower lobes with bilateral pleural effusions.  Underlying COPD change likely. Differential considerations include  pulmonary venous hypertension and congestive heart failure.     Radiographically, chest appears essentially unchanged.                                                                                       Impression:       1. Cardiomegaly with perihilar/lower lobe infiltrates and pleural  effusions. Differential  considerations include congestive heart failure.        This report was finalized on 07/18/2017 08:23 by Dr. Rogelio Bedolla MD.          I have reviewed the patient current medications.     Assessment/Plan     Hospital Problem List     New onset a-fib        Assessment    Paroxysmal atrial fibrillation  Confusion v. Purposefully not interacting with physician resolved  Congestive heart failure. Acute      Plan    IV lasix x 1  Increase activity  Home possible in AM        Chanell Bella DO   07/18/17   10:21 AM       Electronically signed by Chanell Bella DO at 7/18/2017 10:26 AM      Chanell Bella DO at 7/19/2017 12:29 PM  Version 1 of 1             [facility] Medicine Services  INPATIENT PROGRESS NOTE    Length of Stay: 2  Date of Admission: 7/17/2017  Primary Care Physician: Balaji Gordon MD    Subjective   Chief Complaint: Feels cold  HPI   No nausea.  No pain. No shortness of breath.  Per nursing has been confused.    Review of Systems     All pertinent negatives and positives are as above. All other systems have been reviewed and are negative unless otherwise stated.     Objective    Temp:  [96.9 °F (36.1 °C)-98.9 °F (37.2 °C)] 97.4 °F (36.3 °C)  Heart Rate:  [] 94  Resp:  [16-18] 18  BP: (102-122)/(58-97) 102/62  Physical Exam   Constitutional: She appears well-developed and well-nourished.   HENT:   Head: Normocephalic and atraumatic.   Eyes: Conjunctivae and EOM are normal. Pupils are equal, round, and reactive to light.   Neck: Neck supple.   Cardiovascular: Normal rate and regular rhythm.  Exam reveals no gallop and no friction rub.    No murmur heard.  Pulmonary/Chest: Effort normal and breath sounds normal.   Abdominal: Soft. Bowel sounds are normal. There is no hepatosplenomegaly. There is no tenderness.   Musculoskeletal: Normal range of motion. She exhibits no edema.   Neurological: She is alert. No cranial nerve deficit.   oriented to self      Skin: Skin is warm and dry.   Psychiatric: She has a normal mood and affect. Her behavior is normal.   Nursing note and vitals reviewed.          Results Review:  I have reviewed the labs, radiology results, and diagnostic studies.    Laboratory Data:     Results from last 7 days  Lab Units 07/18/17  1028 07/17/17  0952   WBC 10*3/mm3 5.41 8.94   HEMOGLOBIN g/dL 12.3 13.7   HEMATOCRIT % 36.7* 40.1   PLATELETS 10*3/mm3 244 216          Results from last 7 days  Lab Units 07/18/17  1028 07/17/17  0952   SODIUM mmol/L 130* 129*   POTASSIUM mmol/L 4.6 4.6   CHLORIDE mmol/L 100 99   CO2 mmol/L 16.0* 20.0*   BUN mg/dL 19 18   CREATININE mg/dL 0.67 0.75   CALCIUM mg/dL 8.7 8.9   BILIRUBIN mg/dL 0.7 1.0   ALK PHOS U/L 98 124*   ALT (SGPT) U/L 84* 93*   AST (SGOT) U/L 61* 77*   GLUCOSE mg/dL 125* 93       Culture Data:   Blood Culture   Date Value Ref Range Status   07/17/2017 No growth at 2 days  Preliminary   07/17/2017 No growth at 2 days  Preliminary     Urine Culture   Date Value Ref Range Status   07/17/2017 >100,000 CFU/mL Escherichia coli (A)  Final       Radiology Data:   Imaging Results (last 24 hours)     ** No results found for the last 24 hours. **          I have reviewed the patient current medications.     Assessment/Plan     Hospital Problem List     New onset a-fib          Assessment     Paroxysmal atrial fibrillation  Confusion suspect component of dementia  Congestive heart failure. Acute  E coli urinary tract infection.    Plan    Change to oral antibiotic  Social service consult for SNF    Chair  PT mobilize          Discharge Planning: I expect patient to be discharged to SNF in 1-2 days.    Chanell Bella DO   07/19/17   12:29 PM       Electronically signed by Chanell Bella DO at 7/19/2017 12:37 PM           Physical Therapy Notes (last 72 hours) (Notes from 7/17/2017 10:54 AM through 7/20/2017 10:54 AM)      Rogelio Degroot, PT DPT at 7/19/2017 11:55 AM  Version 1 of 1         Problem:  Patient Care Overview (Adult)  Goal: Plan of Care Review  Outcome: Ongoing (interventions implemented as appropriate)    07/19/17 1153   Coping/Psychosocial Response Interventions   Plan Of Care Reviewed With patient   Outcome Evaluation   Outcome Summary/Follow up Plan PT eval complete. pt required maxA for bed mobility and transfers. pt had difficulty following commands due to confusion. pt would benefit from continued skilled PT to address weakness and impaired functional mobility. Anticipate D/C to SNF.          Problem: Inpatient Physical Therapy  Goal: Bed Mobility Goal LTG- PT  Outcome: Ongoing (interventions implemented as appropriate)    07/19/17 1153   Bed Mobility PT LTG   Bed Mobility PT LTG, Date Established 07/19/17   Bed Mobility PT LTG, Time to Achieve by discharge   Bed Mobility PT LTG, Activity Type all bed mobility   Bed Mobility PT LTG, [place] Level minimum assist (75% patient effort)   Bed Mobility PT Goal LTG, Assist Device bed rails       Goal: Transfer Training Goal 1 LTG- PT  Outcome: Ongoing (interventions implemented as appropriate)    07/19/17 1153   Transfer Training PT LTG   Transfer Training PT LTG, Date Established 07/19/17   Transfer Training PT LTG, Time to Achieve by discharge   Transfer Training PT LTG, Activity Type bed to chair /chair to bed   Transfer Training PT LTG, [place] Level minimum assist (75% patient effort)   Transfer Training PT LTG, Assist Device (with appropriate UE support)       Goal: Strength Goal LTG- PT  Outcome: Ongoing (interventions implemented as appropriate)    07/19/17 1153   Strength Goal PT LTG   Strength Goal PT LTG, Date Established 07/19/17   Strength Goal PT LTG, Time to Achieve by discharge   Strength Goal PT LTG, Functional Goal perfrom 15 reps BLE seated ther ex              Electronically signed by Rogelio Degroot PT DPT at 7/19/2017 11:55 AM      Rogelio Degroot PT DPT at 7/19/2017 11:57 AM  Version 1 of 1         [facility] -  Physical Therapy Initial Evaluation  [facility]     Patient Name: Kal Andrews  : 10/24/1926  MRN: [id number]  Today's Date: 2017   Onset of Illness/Injury or Date of Surgery Date: 17  Date of Referral to PT: 17  Referring Physician: Dr Bella      Admit Date: 2017     Visit Dx:    ICD-10-CM ICD-9-CM   1. New onset a-fib I48.91 427.31   2. Acute systolic congestive heart failure I50.21 428.21     428.0   3. Acute UTI (urinary tract infection) N39.0 599.0   4. Chronic obstructive pulmonary disease with acute exacerbation J44.1 491.21   5. Impaired functional mobility, balance, and endurance Z74.09 V49.89     Patient Active Problem List   Diagnosis   • Cardiac pacemaker in situ   • Hypertension   • Paroxysmal SVT (supraventricular tachycardia)   • Sinoatrial node dysfunction   • Venous insufficiency of leg   • Allergic rhinitis   • Gastro-esophageal reflux   • Cerumen debris on tympanic membrane   • Arthritis   • Status post placement of cardiac pacemaker   • Edema   • New onset a-fib     Past Medical History:   Diagnosis Date   • Allergic rhinitis    • Arthritis    • Cerumen debris on tympanic membrane    • Edema    • Gastro-esophageal reflux    • H/O dilation and curettage     Dilation And Curettage Of Uterus - multiple   • Hypertension    • Hypertension, benign    • Pacemaker    • Paroxysmal SVT (supraventricular tachycardia)    • Sinoatrial node dysfunction    • Status post placement of cardiac pacemaker    • Venous insufficiency of leg      Past Surgical History:   Procedure Laterality Date   • APPENDECTOMY     • BREAST BIOPSY     • CARDIAC ELECTROPHYSIOLOGY PROCEDURE N/A 2016    Procedure: PPM generator change - dual;  Surgeon: Luis Armando Brown MD;  Location: [facility] INVASIVE LOCATION;  Service:    • CHOLECYSTECTOMY     • MYRINGOPLASTY W/ PAPER PATCH     • PACEMAKER IMPLANTATION            PT ASSESSMENT (last 72 hours)      PT Evaluation       17 1046 17 2585      Rehab Evaluation    Document Type evaluation   see MAR  -PB     Subjective Information agree to therapy;complains of;fatigue;weakness  -PB     Patient Effort, Rehab Treatment fair  -PB     General Information    Patient Profile Review yes  -PB     Onset of Illness/Injury or Date of Surgery Date 07/17/17  -PB     Referring Physician Dr Bella  -PB     General Observations awake and alert with nursing, pt had urine incontinence  -PB     Pertinent History Of Current Problem pain all over, confusion; new onset A-fib, CHF, e-coli in urine  -PB     Precautions/Limitations fall precautions  -PB     Prior Level of Function --   typically w/c bound per notes;pt not sure of PLOF  -PB     Equipment Currently Used at Home --   pt states she doesnt know  -PB     Plans/Goals Discussed With patient;agreed upon  -PB     Risks Reviewed patient:;LOB;nausea/vomiting;dizziness;increased discomfort;change in vital signs  -PB     Benefits Reviewed patient:;improve function;increase independence;increase strength;increase balance  -PB     Barriers to Rehab medically complex;previous functional deficit;cognitive status  -PB     Living Environment    Lives With facility resident  -PB facility resident  -KISHA    Living Arrangements assisted living   [facility]  -PB assisted living   [facility]    Clinical Impression    Date of Referral to PT 07/18/17  -PB     PT Diagnosis impaired mobility  -PB     Patient/Family Goals Statement pt unable to state at this time  -PB     Criteria for Skilled Therapeutic Interventions Met yes;treatment indicated  -PB     Pathology/Pathophysiology Noted (Describe Specifically for Each System) musculoskeletal  -PB     Impairments Found (describe specific impairments) gait, locomotion, and balance  -PB     Functional Limitations in Following Categories (Describe Specific Limitations) self-care  -PB     Rehab Potential fair, will monitor progress closely  -PB     Predicted Duration of Therapy  Intervention (days/wks) until D/C  -PB     Pain Assessment    Pain Assessment No/denies pain  -PB     Cognitive Assessment/Intervention    Current Cognitive/Communication Assessment impaired  -PB     Orientation Status oriented to;person  -PB     Follows Commands/Answers Questions 75% of the time;able to follow single-step instructions;needs repetition   [place]  -PB     Personal Safety decreased awareness, need for assist;decreased awareness, need for safety;decreased insight to deficits  -PB     Personal Safety Interventions fall prevention program maintained;gait belt;nonskid shoes/slippers when out of bed;supervised activity  -PB     ROM (Range of Motion)    General ROM Detail BLE AROM WFL, R shoulder impaired 75%, L shoulder impaired 50%  -PB     MMT (Manual Muscle Testing)    General MMT Assessment Detail BLE functionally 3-/5, BUE functioanlly 3-/5  -PB     Bed Mobility, Assessment/Treatment    Bed Mobility, Assistive Device head of bed elevated;bed rails  -PB     Bed Mobility, Roll Left, [place] minimum assist (75% patient effort)  -PB     Bed Mobility, Roll Right, [place] minimum assist (75% patient effort)  -PB     Bed Mob, Supine to Sit, [place] maximum assist (25% patient effort);verbal cues required  -PB     Bed Mob, Sit to Supine, [place] verbal cues required;maximum assist (25% patient effort)  -PB     Bed Mobility, Safety Issues cognitive deficits limit understanding;decreased use of arms for pushing/pulling;decreased use of legs for bridging/pushing  -PB     Bed Mobility, Impairments impaired balance;strength decreased  -PB     Transfer Assessment/Treatment    Transfers, Bed-Chair [place] maximum assist (25% patient effort);verbal cues required;2 person assist required;hand held assist  -PB     Transfers, Chair-Bed [place] maximum assist (25% patient effort);verbal cues required;hand held assist  -PB     Toilet Transfer, [place] maximum assist (25% patient effort)      assisted in cleaning pt up and changing linens  -PB     Toilet Transfer, Assistive Device bedside commode without drop arms  -PB     Transfer, Safety Issues sequencing ability decreased  -PB     Transfer, Impairments impaired balance;coordination impaired   cognitive deficits  -PB     Gait Assessment/Treatment    Gait, Comment defer due to weakness  -PB     Motor Skills/Interventions    Additional Documentation Balance Skills Training (Group)  -PB     Balance Skills Training    Sitting-Level of Assistance Minimum assistance;Close supervision  -PB     Sitting-Balance Support Right upper extremity supported;Left upper extremity supported;Feet supported  -PB     Positioning and Restraints    Pre-Treatment Position in bed  -PB     Post Treatment Position bed  -PB     In Bed fowlers;call light within reach;encouraged to call for assist;exit alarm on;side rails up x3  -PB       07/17/17 1236       General Information    Equipment Currently Used at Home other (see comments)   pt doesn't know  -CP       User Key  (r) = Recorded By, (t) = Taken By, (c) = Cosigned By    Initials Name Provider Type    CP Aishwarya Hudson RN Registered Nurse    PB Rogelio Degroot, PT DPT Physical Therapist    KISHA Elam, MSW           Physical Therapy Education     Title: PT OT SLP Therapies (Active)     Topic: Physical Therapy (Active)     Point: Mobility training (Active)    Learning Progress Summary    Learner Readiness Method Response Comment Documented by Status   Patient Acceptance E NR bed mobility, transfers  07/19/17 1155 Active                      User Key     Initials Effective Dates Name Provider Type Discipline     08/02/16 -  Rogelio Degroot, PT DPT Physical Therapist PT                PT Recommendation and Plan  Anticipated Discharge Disposition: skilled nursing facility  Planned Therapy Interventions: balance training, bed mobility training, home exercise program, patient/family education,  strengthening, transfer training  PT Frequency: daily, 2 times/day, per priority policy  Plan of Care Review  Plan Of Care Reviewed With: patient  Outcome Summary/Follow up Plan: PT eval complete. pt required maxA for bed mobility and transfers. pt had difficulty following commands due to confusion. pt would benefit from continued skilled PT to address weakness and impaired functional mobility. Anticipate D/C to SNF.           IP PT Goals       07/19/17 1153          Bed Mobility PT LTG    Bed Mobility PT LTG, Date Established 07/19/17  -PB      Bed Mobility PT LTG, Time to Achieve by discharge  -PB      Bed Mobility PT LTG, Activity Type all bed mobility  -PB      Bed Mobility PT LTG, [place] Level minimum assist (75% patient effort)  -PB      Bed Mobility PT Goal  LTG, Assist Device bed rails  -PB      Transfer Training PT LTG    Transfer Training PT LTG, Date Established 07/19/17  -PB      Transfer Training PT LTG, Time to Achieve by discharge  -PB      Transfer Training PT LTG, Activity Type bed to chair /chair to bed  -PB      Transfer Training PT LTG, [place] Level minimum assist (75% patient effort)  -PB      Transfer Training PT LTG, Assist Device --   with appropriate UE support  -PB      Strength Goal PT LTG    Strength Goal PT LTG, Date Established 07/19/17  -PB      Strength Goal PT LTG, Time to Achieve by discharge  -PB      Strength Goal PT LTG, Functional Goal perfrom 15 reps BLE seated ther ex  -PB        User Key  (r) = Recorded By, (t) = Taken By, (c) = Cosigned By    Initials Name Provider Type    PB Rogelio Degroot, PT DPT Physical Therapist                Outcome Measures       07/19/17 1046          How much help from another person do you currently need...    Turning from your back to your side while in flat bed without using bedrails? 3  -PB      Moving from lying on back to sitting on the side of a flat bed without bedrails? 2  -PB      Moving to and from a bed to a chair  (including a wheelchair)? 2  -PB      Standing up from a chair using your arms (e.g., wheelchair, bedside chair)? 2  -PB      Climbing 3-5 steps with a railing? 1  -PB      To walk in hospital room? 1  -PB      AM-PAC 6 Clicks Score 11  -PB      Functional Assessment    Outcome Measure Options AM-PAC 6 Clicks Basic Mobility (PT)  -PB        User Key  (r) = Recorded By, (t) = Taken By, (c) = Cosigned By    Initials Name Provider Type    PB Rogelio Degroot, PT DPT Physical Therapist           Time Calculation:         PT Charges       07/19/17 1156          Time Calculation    Start Time 1117   additional time from [id number]  -PB      Stop Time 1151  -PB      Time Calculation (min) 34 min  -PB      PT Received On 07/19/17  -PB      PT Goal Re-Cert Due Date 07/29/17  -PB        User Key  (r) = Recorded By, (t) = Taken By, (c) = Cosigned By    Initials Name Provider Type    PB Rogelio Degroot, PT DPT Physical Therapist          Therapy Charges for Today     Code Description Service Date Service Provider Modifiers Qty    00490552477 HC PT CHNG MAIN POS CURRENT 7/19/2017 Rogelio Degroot PT DPT GP, CL 1    65582362250 HC PT CHNG MAIN POS PROJECTED 7/19/2017 Rogelio Degroot, PT DPT GP, CK 1    58850678662 HC PT EVAL MOD COMPLEXITY 3 7/19/2017 Rogelio Degroot PT DPT GP 1          PT G-Codes  Outcome Measure Options: AM-PAC 6 Clicks Basic Mobility (PT)  Score: 11  Functional Limitation: Changing and maintaining body position  Changing and Maintaining Body Position Current Status (): At least 60 percent but less than 80 percent impaired, limited or restricted  Changing and Maintaining Body Position Goal Status (): At least 40 percent but less than 60 percent impaired, limited or restricted      Rogelio Degroot PT DPT  7/19/2017            Electronically signed by Rogelio Degroot PT DPT at 7/19/2017 11:57 AM      Julia Rosado PTA at 7/20/2017 10:47 AM  Version 1 of 1         Problem: Patient Care  Overview (Adult)  Goal: Plan of Care Review  Outcome: Ongoing (interventions implemented as appropriate)    17 1025   Coping/Psychosocial Response Interventions   Plan Of Care Reviewed With patient   Patient Care Overview   Progress unable to show any progress toward functional goals   Outcome Evaluation   Outcome Summary/Follow up Plan Pt. not participating with therapy. Pt. kept eyes closed the entire time and would not follow any directions. Assisted pt. with PROM to all extremities, but pt. made no effort in helping, though pt. moves all extremities while lying in bed. Pt moans throughout tx. Pt will need long term care following discharge from hospital unless she begins to participate.              Electronically signed by Julai Rosado PTA at 2017 10:47 AM      Julia Rosado PTA at 2017 10:48 AM  Version 1 of 1         Acute Care - Physical Therapy Treatment Note  [facility]     Patient Name: Kal Andrews  : 10/24/1926  MRN: [id number]  Today's Date: 2017  Onset of Illness/Injury or Date of Surgery Date: 17  Date of Referral to PT: 17  Referring Physician: Dr Bella    Admit Date: 2017    Visit Dx:    ICD-10-CM ICD-9-CM   1. New onset a-fib I48.91 427.31   2. Acute systolic congestive heart failure I50.21 428.21     428.0   3. Acute UTI (urinary tract infection) N39.0 599.0   4. Chronic obstructive pulmonary disease with acute exacerbation J44.1 491.21   5. Impaired functional mobility, balance, and endurance Z74.09 V49.89     Patient Active Problem List   Diagnosis   • Cardiac pacemaker in situ   • Hypertension   • Paroxysmal SVT (supraventricular tachycardia)   • Sinoatrial node dysfunction   • Venous insufficiency of leg   • Allergic rhinitis   • Gastro-esophageal reflux   • Cerumen debris on tympanic membrane   • Arthritis   • Status post placement of cardiac pacemaker   • Edema   • New onset a-fib               Adult Rehabilitation Note       17  1025          Rehab Assessment/Intervention    Discipline physical therapy assistant  -      Document Type therapy note (daily note)  -      Subjective Information agree to therapy;complains of;pain  -      Treatment Not Performed, Comment Pt. kept her eyes closed and would not assist with any exercise. Pt. would moan and ask questions, but would not follow instructions.  -      Precautions/Limitations fall precautions   exit alarm  -      Recorded by [] Julia Rosado PTA      Pain Assessment    Pain Assessment Bacon-Baker FACES  -      Bacon-Baker FACES Pain Rating 4  -      Pain Type Chronic pain  -      Pain Location Generalized  -      Pain Frequency Intermittent  -      Pain Intervention(s) Repositioned  -      Recorded by [] Julia Rosado PTA      Bed Mobility, Assessment/Treatment    Bed Mobility, Comment Pt. not participating with exercises. Not safe to attempt mobility at this time.  -MF      Recorded by [] Julia Rosado PTA      Therapy Exercises    Bilateral Lower Extremities PROM:;20 reps;supine  -      Bilateral Upper Extremity PROM:;20 reps;supine  -MF      Recorded by [] Julia Rosado PTA      Positioning and Restraints    Pre-Treatment Position in bed  -      Post Treatment Position bed  -MF      In Bed fowlers;call light within reach;side rails up x3;exit alarm on  -      Recorded by [] Julia Rosado PTA        User Key  (r) = Recorded By, (t) = Taken By, (c) = Cosigned By    Initials Name Effective Dates     Julia Rosado PTA 08/02/16 -                 IP PT Goals       07/19/17 1153          Bed Mobility PT LTG    Bed Mobility PT LTG, Date Established 07/19/17  -PB      Bed Mobility PT LTG, Time to Achieve by discharge  -PB      Bed Mobility PT LTG, Activity Type all bed mobility  -PB      Bed Mobility PT LTG, [place] Level minimum assist (75% patient effort)  -PB      Bed Mobility PT Goal  LTG, Assist Device bed rails   -PB      Transfer Training PT LTG    Transfer Training PT LTG, Date Established 07/19/17  -PB      Transfer Training PT LTG, Time to Achieve by discharge  -PB      Transfer Training PT LTG, Activity Type bed to chair /chair to bed  -PB      Transfer Training PT LTG, [place] Level minimum assist (75% patient effort)  -PB      Transfer Training PT LTG, Assist Device --   with appropriate UE support  -PB      Strength Goal PT LTG    Strength Goal PT LTG, Date Established 07/19/17  -PB      Strength Goal PT LTG, Time to Achieve by discharge  -PB      Strength Goal PT LTG, Functional Goal perfrom 15 reps BLE seated ther ex  -PB        User Key  (r) = Recorded By, (t) = Taken By, (c) = Cosigned By    Initials Name Provider Type    PB Rogelio Degroot, PT DPT Physical Therapist          Physical Therapy Education     Title: PT OT SLP Therapies (Active)     Topic: Physical Therapy (Active)     Point: Mobility training (Active)    Learning Progress Summary    Learner Readiness Method Response Comment Documented by Status   Patient Acceptance E NR bed mobility, transfers  07/19/17 1155 Active               Point: Home exercise program (Active)    Learning Progress Summary    Learner Readiness Method Response Comment Documented by Status   Patient Nonacceptance E NL Educated pt. on benefits of activity.  07/20/17 1047 Active                      User Key     Initials Effective Dates Name Provider Type Discipline     08/02/16 -  Julia Rosado, PTA Physical Therapy Assistant PT     08/02/16 -  Rogelio Degroot, PT DPT Physical Therapist PT                    PT Recommendation and Plan  Anticipated Discharge Disposition: skilled nursing facility  Planned Therapy Interventions: balance training, bed mobility training, home exercise program, patient/family education, strengthening, transfer training  PT Frequency: daily, 2 times/day, per priority policy  Plan of Care Review  Plan Of Care Reviewed With:  patient  Progress: unable to show any progress toward functional goals  Outcome Summary/Follow up Plan: Pt. not participating with therapy. Pt. kept eyes closed the entire time and would not follow any directions. Assisted pt. with PROM to all extremities, but pt. made no effort in helping, though pt. moves all extremities while lying in bed. Pt moans throughout tx. Pt will need long term care following discharge from hospital unless she begins to participate.          Outcome Measures       07/20/17 1025 07/19/17 1046       How much help from another person do you currently need...    Turning from your back to your side while in flat bed without using bedrails? 1  -MF 3  -PB     Moving from lying on back to sitting on the side of a flat bed without bedrails? 1  -MF 2  -PB     Moving to and from a bed to a chair (including a wheelchair)? 1  -MF 2  -PB     Standing up from a chair using your arms (e.g., wheelchair, bedside chair)? 1  -MF 2  -PB     Climbing 3-5 steps with a railing? 1  -MF 1  -PB     To walk in hospital room? 1  -MF 1  -PB     AM-PAC 6 Clicks Score 6  -MF 11  -PB     Functional Assessment    Outcome Measure Options AM-PAC 6 Clicks Basic Mobility (PT)  -MF AM-PAC 6 Clicks Basic Mobility (PT)  -PB       User Key  (r) = Recorded By, (t) = Taken By, (c) = Cosigned By    Initials Name Provider Type    MARIAN Rosado PTA Physical Therapy Assistant    PB Rogelio Degroot, PT DPT Physical Therapist           Time Calculation:         PT Charges       07/20/17 1048          Time Calculation    Start Time 1025  -      Stop Time 1042  -      Time Calculation (min) 17 min  -      PT Non-Billable Time (min) 0 min  -      PT Received On 07/20/17  -      PT Goal Re-Cert Due Date 07/29/17  -      Time Calculation- PT    Total Timed Code Minutes- PT 17 minute(s)  -        User Key  (r) = Recorded By, (t) = Taken By, (c) = Cosigned By    Initials Name Provider Type    MARIAN Rosado PTA  Physical Therapy Assistant          Therapy Charges for Today     Code Description Service Date Service Provider Modifiers Qty    63678215138 HC PT THER PROC EA 15 MIN 7/20/2017 Julia Rosado PTA GP 1          PT G-Codes  Outcome Measure Options: AM-PAC 6 Clicks Basic Mobility (PT)  Score: 11  Functional Limitation: Changing and maintaining body position  Changing and Maintaining Body Position Current Status (): At least 60 percent but less than 80 percent impaired, limited or restricted  Changing and Maintaining Body Position Goal Status (): At least 40 percent but less than 60 percent impaired, limited or restricted    Julia Rosado PTA  7/20/2017            Electronically signed by Julia Rosado PTA at 7/20/2017 10:48 AM

## 2017-07-21 NOTE — THERAPY DISCHARGE NOTE
Acute Care - Physical Therapy Discharge Summary  [facility]       Patient Name: Kal Andrews  : 10/24/1926  MRN: [id number]    Today's Date: 2017  Onset of Illness/Injury or Date of Surgery Date: 17    Date of Referral to PT: 17  Referring Physician: Dr Bella      Admit Date: 2017      PT Recommendation and Plan    Visit Dx:    ICD-10-CM ICD-9-CM   1. New onset a-fib I48.91 427.31   2. Acute systolic congestive heart failure I50.21 428.21     428.0   3. Acute UTI (urinary tract infection) N39.0 599.0   4. Chronic obstructive pulmonary disease with acute exacerbation J44.1 491.21   5. Impaired functional mobility, balance, and endurance Z74.09 V49.89             Outcome Measures       17 1025 17 1046       How much help from another person do you currently need...    Turning from your back to your side while in flat bed without using bedrails? 1  -MF 3  -PB     Moving from lying on back to sitting on the side of a flat bed without bedrails? 1  -MF 2  -PB     Moving to and from a bed to a chair (including a wheelchair)? 1  -MF 2  -PB     Standing up from a chair using your arms (e.g., wheelchair, bedside chair)? 1  -MF 2  -PB     Climbing 3-5 steps with a railing? 1  -MF 1  -PB     To walk in hospital room? 1  -MF 1  -PB     AM-PAC 6 Clicks Score 6  -MF 11  -PB     Functional Assessment    Outcome Measure Options AM-PAC 6 Clicks Basic Mobility (PT)  - AM-PAC 6 Clicks Basic Mobility (PT)  -PB       User Key  (r) = Recorded By, (t) = Taken By, (c) = Cosigned By    Initials Name Provider Type    MARIAN Rosado, PTA Physical Therapy Assistant    LAURE Degroot, PT DPT Physical Therapist                      IP PT Goals       17 0852 17 1153       Bed Mobility PT LTG    Bed Mobility PT LTG, Date Established  17  -PB     Bed Mobility PT LTG, Time to Achieve  by discharge  -PB     Bed Mobility PT LTG, Activity Type  all bed mobility  -PB     Bed Mobility PT  LTG, [place] Level  minimum assist (75% patient effort)  -PB     Bed Mobility PT Goal  LTG, Assist Device  bed rails  -PB     Bed Mobility PT LTG, Date Goal Reviewed 07/21/17  -      Bed Mobility PT LTG, Outcome goal not met  -      Bed Mobility PT LTG, Reason Goal Not Met discharged from facility  -      Transfer Training PT LTG    Transfer Training PT LTG, Date Established  07/19/17  -PB     Transfer Training PT LTG, Time to Achieve  by discharge  -PB     Transfer Training PT LTG, Activity Type  bed to chair /chair to bed  -PB     Transfer Training PT LTG, [place] Level  minimum assist (75% patient effort)  -PB     Transfer Training PT LTG, Assist Device  --   with appropriate UE support  -PB     Transfer Training PT  LTG, Date Goal Reviewed 07/21/17  -      Transfer Training PT LTG, Outcome goal not met  -      Transfer Training PT LTG, Reason Goal Not Met discharged from facility  -      Strength Goal PT LTG    Strength Goal PT LTG, Date Established  07/19/17  -PB     Strength Goal PT LTG, Time to Achieve  by discharge  -PB     Strength Goal PT LTG, Functional Goal  perfrom 15 reps BLE seated ther ex  -PB     Strength Goal PT LTG, Date Goal Reviewed 07/21/17  -      Strength Goal PT LTG, Outcome goal not met  -MF      Strength Goal PT LTG, Reason Goal Not Met discharged from facility  -        User Key  (r) = Recorded By, (t) = Taken By, (c) = Cosigned By    Initials Name Provider Type     Julia Rosado PTA Physical Therapy Assistant    LAURE Degroot, PT DPT Physical Therapist          Therapy Charges for Today     Code Description Service Date Service Provider Modifiers Qty    18211678503 HC PT THER PROC EA 15 MIN 7/20/2017 Julia Rosado PTA GP 1          PT Discharge Summary  Anticipated Discharge Disposition: skilled nursing facility  Reason for Discharge: Discharge from facility  Outcomes Achieved: Unable to make functional progress toward goals at this  time  Discharge Destination: [facility]      Julia Rosado, PTA   7/21/2017

## 2017-07-21 NOTE — PLAN OF CARE
Problem: Inpatient Physical Therapy  Goal: Bed Mobility Goal LTG- PT  Outcome: Unable to achieve outcome(s) by discharge Date Met:  07/21/17 07/19/17 1153 07/21/17 0852   Bed Mobility PT LTG   Bed Mobility PT LTG, Date Established 07/19/17 --    Bed Mobility PT LTG, Time to Achieve by discharge --    Bed Mobility PT LTG, Activity Type all bed mobility --    Bed Mobility PT LTG, [place] Level minimum assist (75% patient effort) --    Bed Mobility PT Goal LTG, Assist Device bed rails --    Bed Mobility PT LTG, Date Goal Reviewed --  07/21/17   Bed Mobility PT LTG, Outcome --  goal not met   Bed Mobility PT LTG, Reason Goal Not Met --  discharged from facility       Goal: Transfer Training Goal 1 LTG- PT  Outcome: Unable to achieve outcome(s) by discharge Date Met:  07/21/17 07/19/17 1153 07/21/17 0852   Transfer Training PT LTG   Transfer Training PT LTG, Date Established 07/19/17 --    Transfer Training PT LTG, Time to Achieve by discharge --    Transfer Training PT LTG, Activity Type bed to chair /chair to bed --    Transfer Training PT LTG, [place] Level minimum assist (75% patient effort) --    Transfer Training PT LTG, Assist Device (with appropriate UE support) --    Transfer Training PT LTG, Date Goal Reviewed --  07/21/17   Transfer Training PT LTG, Outcome --  goal not met   Transfer Training PT LTG, Reason Goal Not Met --  discharged from facility       Goal: Strength Goal LTG- PT  Outcome: Unable to achieve outcome(s) by discharge Date Met:  07/21/17 07/19/17 1153 07/21/17 0852   Strength Goal PT LTG   Strength Goal PT LTG, Date Established 07/19/17 --    Strength Goal PT LTG, Time to Achieve by discharge --    Strength Goal PT LTG, Functional Goal perfrom 15 reps BLE seated ther ex --    Strength Goal PT LTG, Date Goal Reviewed --  07/21/17   Strength Goal PT LTG, Outcome --  goal not met   Strength Goal PT LTG, Reason Goal Not Met --  discharged from facility

## 2017-07-22 LAB
BACTERIA SPEC AEROBE CULT: NORMAL
BACTERIA SPEC AEROBE CULT: NORMAL

## 2017-07-25 ENCOUNTER — PATIENT OUTREACH (OUTPATIENT)
Dept: CASE MANAGEMENT | Facility: OTHER | Age: 82
End: 2017-07-25

## 2017-07-25 NOTE — OUTREACH NOTE
Facility visit by Care Coordinator. Per Brooke Vieira RN on wing, patient is receiving physical, occupational, and speech therapies, discharge plan unclear at present. Next outreach scheduled in one week.

## 2017-07-31 ENCOUNTER — PATIENT OUTREACH (OUTPATIENT)
Dept: CASE MANAGEMENT | Facility: OTHER | Age: 82
End: 2017-07-31

## 2017-08-04 ENCOUNTER — CLINICAL SUPPORT (OUTPATIENT)
Dept: CARDIOLOGY | Facility: CLINIC | Age: 82
End: 2017-08-04

## 2017-08-04 DIAGNOSIS — Z95.0 CARDIAC PACEMAKER IN SITU: Primary | ICD-10-CM

## 2017-08-04 DIAGNOSIS — I49.5 SINOATRIAL NODE DYSFUNCTION (HCC): ICD-10-CM

## 2017-08-04 PROCEDURE — 93296 REM INTERROG EVL PM/IDS: CPT | Performed by: INTERNAL MEDICINE

## 2017-08-04 PROCEDURE — 93294 REM INTERROG EVL PM/LDLS PM: CPT | Performed by: INTERNAL MEDICINE

## 2017-08-06 NOTE — PROGRESS NOTES
Dual Chamber Pacemaker Evaluation Report  [facility]    August 6, 2017    Primary Cardiologist: Kevin  : [employer] Model: Adapta  Implant date: December 21, 2016    Reason for evaluation: routine  Indication for pacemaker: sinus node dysfunction    Measurements  Atrial sensing - P wave: 0.7-1 mV  Atrial threshold: 0.875V@ 0.4ms  Atrial lead impedance: 540 ohms  Ventricular sensing - R wave: 11.2-22.4 mV  Ventricular threshold: 0.75 V @ 0.4 ms  Ventricular lead impedance:   554 ohms     Diagnostic Data  Atrial paced: 75.1 %  Ventricular paced: 1.6 %  Other: currently in a-fib, 20.4% a-fib burden, longest >96 hours, max v rate 233 bpm   meds include ASA  Battery status: satisfactory   Est 8.5 years      Final Parameters  Mode:  AAI+  Lower rate: 60 bpm   Upper rate: 130 bpm  AV Delay: paced- 150 ms  Sensed-120 ms  Atrial - Amplitude: 1.75 V   Pulse width: 0.4 ms   Sensitivity: 0.18 mV     Ventricular - Amplitude: 2 V  Pulse width: 0.4 ms  Sensitivity: 5.6 mV    Changes made: n/a  Conclusions: normal pacemaker function and stable pacing and sensing thresholds    Follow up: 6 months

## 2017-08-10 ENCOUNTER — PATIENT OUTREACH (OUTPATIENT)
Dept: CASE MANAGEMENT | Facility: OTHER | Age: 82
End: 2017-08-10

## 2018-02-20 ENCOUNTER — EPISODE CHANGES (OUTPATIENT)
Dept: CASE MANAGEMENT | Facility: OTHER | Age: 83
End: 2018-02-20